# Patient Record
Sex: MALE | Race: BLACK OR AFRICAN AMERICAN | NOT HISPANIC OR LATINO | Employment: OTHER | URBAN - METROPOLITAN AREA
[De-identification: names, ages, dates, MRNs, and addresses within clinical notes are randomized per-mention and may not be internally consistent; named-entity substitution may affect disease eponyms.]

---

## 2017-01-10 DIAGNOSIS — C78.00 LUNG METASTASES: Primary | ICD-10-CM

## 2017-01-13 ENCOUNTER — HOSPITAL ENCOUNTER (OUTPATIENT)
Dept: RADIOLOGY | Facility: HOSPITAL | Age: 67
Discharge: HOME | End: 2017-01-13
Attending: INTERNAL MEDICINE
Payer: MEDICARE

## 2017-01-13 DIAGNOSIS — C78.00 LUNG METASTASES: ICD-10-CM

## 2017-01-13 LAB
CREAT SERPL-MCNC: 1 MG/DL
EST. GFR  (AFRICAN AMERICAN): >60 ML/MIN/1.73 M^2
EST. GFR  (NON AFRICAN AMERICAN): >60 ML/MIN/1.73 M^2

## 2017-01-13 PROCEDURE — 71260 CT THORAX DX C+: CPT | Mod: TC

## 2017-01-13 PROCEDURE — 25500020 PHARM REV CODE 255: Performed by: RADIOLOGY

## 2017-01-13 PROCEDURE — 82565 ASSAY OF CREATININE: CPT

## 2017-01-13 PROCEDURE — 74177 CT ABD & PELVIS W/CONTRAST: CPT | Mod: TC

## 2017-01-13 RX ADMIN — IOHEXOL 30 ML: 350 INJECTION, SOLUTION INTRAVENOUS at 12:01

## 2017-01-13 RX ADMIN — IOHEXOL 75 ML: 350 INJECTION, SOLUTION INTRAVENOUS at 03:01

## 2017-03-08 ENCOUNTER — OFFICE VISIT (OUTPATIENT)
Dept: FAMILY MEDICINE | Facility: CLINIC | Age: 67
End: 2017-03-08
Payer: MEDICARE

## 2017-03-08 VITALS
SYSTOLIC BLOOD PRESSURE: 118 MMHG | DIASTOLIC BLOOD PRESSURE: 70 MMHG | TEMPERATURE: 97 F | BODY MASS INDEX: 33.02 KG/M2 | RESPIRATION RATE: 20 BRPM | HEIGHT: 71 IN | WEIGHT: 235.88 LBS | HEART RATE: 82 BPM

## 2017-03-08 DIAGNOSIS — Z00.00 ENCOUNTER FOR PREVENTIVE HEALTH EXAMINATION: Primary | ICD-10-CM

## 2017-03-08 DIAGNOSIS — E66.9 OBESITY (BMI 30.0-34.9): ICD-10-CM

## 2017-03-08 DIAGNOSIS — G57.93 NEUROPATHY OF BOTH FEET: ICD-10-CM

## 2017-03-08 DIAGNOSIS — K21.9 GASTROESOPHAGEAL REFLUX DISEASE WITHOUT ESOPHAGITIS: ICD-10-CM

## 2017-03-08 DIAGNOSIS — R79.89 ABNORMAL LFTS: ICD-10-CM

## 2017-03-08 DIAGNOSIS — I10 ESSENTIAL HYPERTENSION: ICD-10-CM

## 2017-03-08 DIAGNOSIS — C20 RECTAL CANCER: ICD-10-CM

## 2017-03-08 DIAGNOSIS — H53.8 BLURRED VISION, BILATERAL: ICD-10-CM

## 2017-03-08 DIAGNOSIS — R91.8 MULTIPLE LUNG NODULES ON CT: ICD-10-CM

## 2017-03-08 DIAGNOSIS — E11.9 DIABETES MELLITUS WITHOUT COMPLICATION: ICD-10-CM

## 2017-03-08 DIAGNOSIS — Z93.1 GASTROSTOMY STATUS: ICD-10-CM

## 2017-03-08 PROCEDURE — 99999 PR PBB SHADOW E&M-EST. PATIENT-LVL III: CPT | Mod: PBBFAC,,, | Performed by: NURSE PRACTITIONER

## 2017-03-08 PROCEDURE — G0439 PPPS, SUBSEQ VISIT: HCPCS | Mod: S$GLB,,, | Performed by: NURSE PRACTITIONER

## 2017-03-08 PROCEDURE — 99499 UNLISTED E&M SERVICE: CPT | Mod: S$GLB,,, | Performed by: NURSE PRACTITIONER

## 2017-03-08 PROCEDURE — 3078F DIAST BP <80 MM HG: CPT | Mod: S$GLB,,, | Performed by: NURSE PRACTITIONER

## 2017-03-08 PROCEDURE — 3074F SYST BP LT 130 MM HG: CPT | Mod: S$GLB,,, | Performed by: NURSE PRACTITIONER

## 2017-03-08 NOTE — Clinical Note
Your patient was seen today for a HRA visit. Abnormalities have been identified during this visit that may require additional testing and follow up. I have included a copy of my visit note, please review the note and feel free to contact me with any questions.  Thank you for allowing me to participate in the care of your patients.  María Norman NP

## 2017-03-08 NOTE — PROGRESS NOTES
"Solo Cid presented for a  Medicare AWV and comprehensive Health Risk Assessment today. The following components were reviewed and updated:    · Medical history  · Family History  · Social history  · Allergies and Current Medications  · Health Risk Assessment  · Health Maintenance  · Care Team     ** See Completed Assessments for Annual Wellness Visit within the encounter summary.**       The following assessments were completed:  · Living Situation  · CAGE  · Depression Screening  · Timed Get Up and Go  · Whisper Test  · Cognitive Function Screening  · Nutrition Screening  · ADL Screening  · PAQ Screening    Vitals:    03/08/17 1251   BP: 118/70   Pulse: 82   Resp: 20   Temp: 97.4 °F (36.3 °C)   Weight: 107 kg (235 lb 14.3 oz)   Height: 5' 11" (1.803 m)     Body mass index is 32.9 kg/(m^2).  Physical Exam   Constitutional: He appears well-developed. No distress.   HENT:   Head: Normocephalic and atraumatic.   Eyes: Pupils are equal, round, and reactive to light.   Neck: Carotid bruit is not present.   Cardiovascular: Normal rate, regular rhythm, normal heart sounds, intact distal pulses and normal pulses.  Exam reveals no gallop.    No murmur heard.  Pulmonary/Chest: Effort normal and breath sounds normal.   Abdominal: Soft. Bowel sounds are normal. He exhibits no distension. There is no tenderness.   LLQ colostomy     Musculoskeletal: Normal range of motion. He exhibits no edema.   Neurological: He exhibits normal muscle tone.   Skin: Skin is warm, dry and intact.   Psychiatric: He has a normal mood and affect. His speech is normal and behavior is normal. Judgment and thought content normal. Cognition and memory are normal.   Nursing note and vitals reviewed.        Diagnoses and health risks identified today and associated recommendations/orders:    1. Encounter for preventive health examination    2. Rectal cancer  Stable and controlled. S/P radiation/ colostomy and currently on chemotherapy. States he " tolerating well but has fatigue days. Continue current treatment plan as previously prescribed with your colon rectal cancer specialist- Dr. Siddiqui.     3. Gastrostomy status  Stable and controlled S/P LLQ colostomy 2/2 rectal cancer 2015. . Continue current treatment plan as previously prescribed with your colon rectal cancer specialist- Dr. Siddiqui.       4. diabetes mellitus without complication  Component      Latest Ref Rng & Units 8/31/2016   Hemoglobin A1C      4.5 - 6.2 % 4.8   Estimated Avg Glucose      68 - 131 mg/d 91   Stable and controlled on Glucophage daily with diet modification. Continue current treatment plan as previously prescribed with your PCP-Dr. Malagon    5. Essential hypertension  Stable and controlled HCTZ  And Tizanidine. Continue current treatment plan as previously prescribed with your PCP.       6. Multiple lung nodules on CT   CT OF chest 2017 Multiple bilateral pulmonary nodules, the largest 18 mm.  Impression: No evidence of metastatic disease. Stable and controlled. Followed by PCP  and colon rectal cancer specialist- Dr. Siddiqui.    7. Abnormal LFT's  Component      Latest Ref Rng & Units 11/7/2016 8/31/2016   AST      10 - 40 U/L 49 (H) 58 (H)   ALT      10 - 44 U/L 82 (H) 73 (H)   No abnormality of the liver per CT of chest 1/ 2017.     Chronic and stable. Followed by Followed by PCP  and colon rectal cancer specialist- Dr. Siddiqui.    8. Blurred vision, bilateral  This is a new problem that has been identified during today's visit  Pt states worsen  Visual changes in last year . Pt referred to optholomogist 3/16/ 2017 for evaluation.    9. Neuropathy of both feet  Chronic and ongoing problem. Pt states n/t to bilateral toes due to chemo and radiations-states it stable and colon/rectal specialist is aware    10. Gastroesophageal reflux disease without esophagitis  Stable and controlled on Priolesc daily  Continue current treatment plan as previously prescribed with your PCP.        11.Obesity (BMI 30.0-34.9)  Chronic and ongoing problem. BMI 32.9. Today . Per chart  5 lb wt loss noted since 11/16. Followed by  PCP     Discuss health maintenance- pt request to discuss pneumonia vaccines, flu  Shot , Aorta screening and PSA level with PSA 3/14/ 17    Provided Solo with a 5-10 year written screening schedule and personal prevention plan. Recommendations were developed using the USPSTF age appropriate recommendations. Education, counseling, and referrals were provided as needed. After Visit Summary printed and given to patient which includes a list of additional screenings\tests needed.    Return in about 6 days (around 3/14/2017).    María Norman NP

## 2017-03-08 NOTE — PATIENT INSTRUCTIONS
Counseling and Referral of Other Preventative  (Italic type indicates deductible and co-insurance are waived)    Patient Name: Solo Cdi  Today's Date: 3/8/2017      SERVICE LIMITATIONS RECOMMENDATION    Vaccines    · Pneumococcal (once after 65)    · Influenza (annually)    · Hepatitis B (if medium/high risk)    · Prevnar 13      Hepatitis B medium/high risk factors:       - End-stage renal disease       - Hemophiliacs who received Factor VII or         IX concentrates       - Clients of institutions for the mentally             retarded       - Persons who live in the same house as          a HepB carrier       - Homosexual men       - Illicit injectable drug abusers     Pneumococcal: Due       Influenza: did not get last season       Hepatitis B: not needed      Prevnar 13:  DUe      Prostate cancer screening (annually to age 75)     Prostate specific antigen (PSA) Shared decision making with Provider. Sometimes a co-pay may be required if the patient decides to have this test. The USPSTF no longer recommends prostate cancer screening routinely in medicine: Due now    Colorectal cancer screening (to age 75)    · Fecal occult blood test (annual)  · Flexible sigmoidoscopy (5y)  · Screening colonoscopy (10y)  · Barium enema   S/P colostomy     Diabetes self-management training (no USPSTF recommendations)  Requires referral by treating physician for patient with diabetes or renal disease. 10 hours of initial DSMT sessions of no less than 30 minutes each in a continuous 12-month period. 2 hours of follow-up DSMT in subsequent years.  Discuss with PCP    Glaucoma screening (no USPSTF recommendation)  Diabetes mellitus, family history   , age 50 or over    American, age 65 or over Due     Medical nutrition therapy for diabetes or renal disease (no recommended schedule)  Requires referral by treating physician for patient with diabetes or renal disease or kidney transplant within the past 3  years.  Can be provided in same year as diabetes self-management training (DSMT), and CMS recommends medical nutrition therapy take place after DSMT. Up to 3 hours for initial year and 2 hours in subsequent years.  Due    Cardiovascular screening blood tests (every 5 years)  · Fasting lipid panel  Order as a panel if possible  Done this year, repeat every year    Diabetes screening tests (at least every 3 years, Medicare covers annually or at 6-month intervals for prediabetic patients)  · Fasting blood sugar (FBS) or glucose tolerance test (GTT)  Patient must be diagnosed with one of the following:       - Hypertension       - Dyslipidemia       - Obesity (BMI 30kg/m2)       - Previous elevated impaired FBS or GTT       ... or any two of the following:       - Overweight (BMI 25 but <30)       - Family history of diabetes       - Age 65 or older       - History of gestational diabetes or birth of baby weighing more than 9 pounds  Done this year, repeat every year    Abdominal aortic aneurysm screening (once)  · Sonogram   Limited to patients who meet one of the following criteria:       - Men who are 65-75 years old and have smoked more than 100 cigarette in their lifetime       - Anyone with a family history of abdominal aortic aneurysm       - Anyone recommended for screening by the USPSTF Need by 6/2017    HIV screening (annually for increased risk patients)  · HIV-1 and HIV-2 by EIA, or JORI, rapid antibody test or oral mucosa transudate  Patients must be at increased risk for HIV infection per USPSTF guidelines or pregnant. Tests covered annually for patient at increased risk or as requested by the patient. Pregnant patients may receive up to 3 tests during pregnancy.  Risks discussed, screening is Done per pt     Smoking cessation counseling (up to 8 sessions per year)  Patients must be asymptomatic of tobacco-related conditions to receive as a preventative service.  not needed    Subsequent annual wellness  visit  At least 12 months since last AWV  Return in one year     The following information is provided to all patients.  This information is to help you find resources for any of the problems found today that may be affecting your health:                Living healthy guide: www.[facility].[place]      Understanding Diabetes: www.diabetes.org      Eating healthy: www.cdc.gov/healthyweight      [facility] home safety checklist: www.cdc.gov/steadi/patient.html      Agency on Aging: www.goea.[place].[facility]      Alcoholics anonymous (AA): www.aa.org      Physical Activity: www.yayo.nih.gov/ng6gpbn      Tobacco use: www.quitwithusla.org

## 2017-03-08 NOTE — MR AVS SNAPSHOT
[facility]  [address] [address]  Phone: [phone]                  Solo iCd   3/8/2017 11:00 AM   Office Visit    Description:  Male : 1950   Provider:  María Norman NP   Department:  [facility]           Diagnoses this Visit        Comments    Encounter for preventive health examination    -  Primary            To Do List           Future Appointments        Provider Department Dept Phone    3/14/2017 9:00 AM MD Freddie Berry - Internal Medicine [phone]    3/16/2017 9:00 AM KOSTAS Sweet - Ophthalmology [phone]      Goals (5 Years of Data)     None      [facility]     [facility] On Call Nurse Care Line -  Assistance  Registered nurses in the [facility] On Call Center provide clinical advisement, health education, appointment booking, and other advisory services.  Call for this free service at [phone].             Medications                Verify that the below list of medications is an accurate representation of the medications you are currently taking.  If none reported, the list may be blank. If incorrect, please contact your healthcare provider. Carry this list with you in case of emergency.           Current Medications     hydrochlorothiazide (HYDRODIURIL) 12.5 MG Tab Take 1 tablet (12.5 mg total) by mouth once daily.    hydrocodone-acetaminophen 10-325mg (NORCO)  mg Tab     metformin (GLUCOPHAGE) 500 MG tablet Take 1 tablet (500 mg total) by mouth 2 (two) times daily with meals.    omeprazole (PRILOSEC) 40 MG capsule     polyethylene glycol (GLYCOLAX) 17 gram/dose powder Take 17 g by mouth once daily.    tizanidine 4 mg Cap Take 4 mg by mouth.           Clinical Reference Information           Allergies as of 3/8/2017     No Known Allergies      Immunizations Administered on Date of Encounter - 3/8/2017     None      [facility] Sign-Up     Activating your  [facility] account is as easy as 1-2-3!     1) Visit [email or web address], select Sign Up Now, enter this activation code and your date of birth, then select Next.  Activation code not generated  Current Patient Portal Status: Account disabled      2) Create a username and password to use when you visit [facility] in the future and select a security question in case you lose your password and select Next.    3) Enter your e-mail address and click Sign Up!    Additional Information  If you have questions, please e-mail [email or web address] or call [phone] to talk to our [facility] staff. Remember, [facility] is NOT to be used for urgent needs. For medical emergencies, dial 911.         Instructions      Counseling and Referral of Other Preventative  (Italic type indicates deductible and co-insurance are waived)    Patient Name: Solo Cid  Today's Date: 3/8/2017      SERVICE LIMITATIONS RECOMMENDATION    Vaccines    · Pneumococcal (once after 65)    · Influenza (annually)    · Hepatitis B (if medium/high risk)    · Prevnar 13      Hepatitis B medium/high risk factors:       - End-stage renal disease       - Hemophiliacs who received Factor VII or         IX concentrates       - Clients of institutions for the mentally             retarded       - Persons who live in the same house as          a HepB carrier       - Homosexual men       - Illicit injectable drug abusers     Pneumococcal: Due       Influenza: did not get last season       Hepatitis B: not needed      Prevnar 13:  DUe      Prostate cancer screening (annually to age 75)     Prostate specific antigen (PSA) Shared decision making with Provider. Sometimes a co-pay may be required if the patient decides to have this test. The USPSTF no longer recommends prostate cancer screening routinely in medicine: Due now    Colorectal cancer screening (to age 75)    · Fecal occult blood test (annual)  · Flexible sigmoidoscopy (5y)  · Screening colonoscopy  (10y)  · Barium enema   S/P colostomy     Diabetes self-management training (no USPSTF recommendations)  Requires referral by treating physician for patient with diabetes or renal disease. 10 hours of initial DSMT sessions of no less than 30 minutes each in a continuous 12-month period. 2 hours of follow-up DSMT in subsequent years.  Discuss with PCP    Glaucoma screening (no USPSTF recommendation)  Diabetes mellitus, family history   , age 50 or over    American, age 65 or over Due     Medical nutrition therapy for diabetes or renal disease (no recommended schedule)  Requires referral by treating physician for patient with diabetes or renal disease or kidney transplant within the past 3 years.  Can be provided in same year as diabetes self-management training (DSMT), and CMS recommends medical nutrition therapy take place after DSMT. Up to 3 hours for initial year and 2 hours in subsequent years.  Due    Cardiovascular screening blood tests (every 5 years)  · Fasting lipid panel  Order as a panel if possible  Done this year, repeat every year    Diabetes screening tests (at least every 3 years, Medicare covers annually or at 6-month intervals for prediabetic patients)  · Fasting blood sugar (FBS) or glucose tolerance test (GTT)  Patient must be diagnosed with one of the following:       - Hypertension       - Dyslipidemia       - Obesity (BMI 30kg/m2)       - Previous elevated impaired FBS or GTT       ... or any two of the following:       - Overweight (BMI 25 but <30)       - Family history of diabetes       - Age 65 or older       - History of gestational diabetes or birth of baby weighing more than 9 pounds  Done this year, repeat every year    Abdominal aortic aneurysm screening (once)  · Sonogram   Limited to patients who meet one of the following criteria:       - Men who are 65-75 years old and have smoked more than 100 cigarette in their lifetime       - Anyone with a family history  of abdominal aortic aneurysm       - Anyone recommended for screening by the USPSTF Need by 6/2017    HIV screening (annually for increased risk patients)  · HIV-1 and HIV-2 by EIA, or JORI, rapid antibody test or oral mucosa transudate  Patients must be at increased risk for HIV infection per USPSTF guidelines or pregnant. Tests covered annually for patient at increased risk or as requested by the patient. Pregnant patients may receive up to 3 tests during pregnancy.  Risks discussed, screening is Done per pt     Smoking cessation counseling (up to 8 sessions per year)  Patients must be asymptomatic of tobacco-related conditions to receive as a preventative service.  not needed    Subsequent annual wellness visit  At least 12 months since last AWV  Return in one year     The following information is provided to all patients.  This information is to help you find resources for any of the problems found today that may be affecting your health:                Living healthy guide: www.[facility].[place].[facility]      Understanding Diabetes: www.diabetes.org      Eating healthy: www.cdc.gov/healthyweight      [facility] home safety checklist: www.cdc.gov/steadi/patient.html      Agency on Aging: www.goea.[place].[facility]      Alcoholics anonymous (AA): www.aa.org      Physical Activity: www.yayo.nih.gov/gx6gvkn      Tobacco use: www.quitwithusla.org          Language Assistance Services     ATTENTION: Language assistance services are available, free of charge. Please call [phone].      ATENCIÓN: Si habla español, tiene a vick disposición servicios gratuitos de asistencia lingüística. Llame al [phone].     CHÚ Ý: N?u b?n nói Ti?ng Vi?t, có các d?ch v? h? tr? ngôn ng? mi?n phí dành cho b?n. G?i s? [phone].         [facility] complies with applicable Federal civil rights laws and does not discriminate on the basis of race, color, national origin, age, disability, or sex.

## 2017-03-17 ENCOUNTER — LAB VISIT (OUTPATIENT)
Dept: LAB | Facility: HOSPITAL | Age: 67
End: 2017-03-17
Attending: INTERNAL MEDICINE
Payer: MEDICARE

## 2017-03-17 ENCOUNTER — OFFICE VISIT (OUTPATIENT)
Dept: INTERNAL MEDICINE | Facility: CLINIC | Age: 67
End: 2017-03-17
Payer: MEDICARE

## 2017-03-17 VITALS
HEIGHT: 64 IN | OXYGEN SATURATION: 97 % | BODY MASS INDEX: 40.19 KG/M2 | DIASTOLIC BLOOD PRESSURE: 74 MMHG | WEIGHT: 235.44 LBS | HEART RATE: 99 BPM | RESPIRATION RATE: 16 BRPM | TEMPERATURE: 99 F | SYSTOLIC BLOOD PRESSURE: 126 MMHG

## 2017-03-17 DIAGNOSIS — E11.9 DIABETES MELLITUS WITHOUT COMPLICATION: ICD-10-CM

## 2017-03-17 DIAGNOSIS — R53.83 FATIGUE, UNSPECIFIED TYPE: ICD-10-CM

## 2017-03-17 DIAGNOSIS — Z93.1 GASTROSTOMY STATUS: ICD-10-CM

## 2017-03-17 DIAGNOSIS — R33.9 URINARY RETENTION: ICD-10-CM

## 2017-03-17 DIAGNOSIS — E55.9 VITAMIN D DEFICIENCY: ICD-10-CM

## 2017-03-17 DIAGNOSIS — Z12.5 ENCOUNTER FOR SCREENING FOR MALIGNANT NEOPLASM OF PROSTATE: ICD-10-CM

## 2017-03-17 DIAGNOSIS — R07.89 OTHER CHEST PAIN: ICD-10-CM

## 2017-03-17 DIAGNOSIS — E66.01 MORBID OBESITY DUE TO EXCESS CALORIES: ICD-10-CM

## 2017-03-17 DIAGNOSIS — C20 RECTAL CANCER: ICD-10-CM

## 2017-03-17 DIAGNOSIS — I10 ESSENTIAL HYPERTENSION: ICD-10-CM

## 2017-03-17 DIAGNOSIS — H53.9 VISUAL DISTURBANCE: Primary | ICD-10-CM

## 2017-03-17 LAB
25(OH)D3+25(OH)D2 SERPL-MCNC: 19 NG/ML
ALBUMIN SERPL BCP-MCNC: 3.3 G/DL
ALP SERPL-CCNC: 73 U/L
ALT SERPL W/O P-5'-P-CCNC: 97 U/L
ANION GAP SERPL CALC-SCNC: 11 MMOL/L
AST SERPL-CCNC: 111 U/L
BASOPHILS # BLD AUTO: 0.03 K/UL
BASOPHILS NFR BLD: 1 %
BILIRUB SERPL-MCNC: 0.8 MG/DL
BUN SERPL-MCNC: 18 MG/DL
CALCIUM SERPL-MCNC: 9.3 MG/DL
CHLORIDE SERPL-SCNC: 104 MMOL/L
CHOLEST/HDLC SERPL: 3.5 {RATIO}
CO2 SERPL-SCNC: 24 MMOL/L
COMPLEXED PSA SERPL-MCNC: 0.67 NG/ML
CREAT SERPL-MCNC: 1 MG/DL
CREAT UR-MCNC: 303 MG/DL
DIFFERENTIAL METHOD: ABNORMAL
EOSINOPHIL # BLD AUTO: 0.2 K/UL
EOSINOPHIL NFR BLD: 8.2 %
ERYTHROCYTE [DISTWIDTH] IN BLOOD BY AUTOMATED COUNT: 14.4 %
EST. GFR  (AFRICAN AMERICAN): >60 ML/MIN/1.73 M^2
EST. GFR  (NON AFRICAN AMERICAN): >60 ML/MIN/1.73 M^2
GLUCOSE SERPL-MCNC: 177 MG/DL
HCT VFR BLD AUTO: 42.4 %
HDL/CHOLESTEROL RATIO: 28.9 %
HDLC SERPL-MCNC: 159 MG/DL
HDLC SERPL-MCNC: 46 MG/DL
HGB BLD-MCNC: 14.1 G/DL
LDLC SERPL CALC-MCNC: 85.4 MG/DL
LYMPHOCYTES # BLD AUTO: 0.9 K/UL
LYMPHOCYTES NFR BLD: 30.5 %
MCH RBC QN AUTO: 31.7 PG
MCHC RBC AUTO-ENTMCNC: 33.3 %
MCV RBC AUTO: 95 FL
MICROALBUMIN UR DL<=1MG/L-MCNC: 29 UG/ML
MICROALBUMIN/CREATININE RATIO: 9.6 UG/MG
MONOCYTES # BLD AUTO: 0.8 K/UL
MONOCYTES NFR BLD: 26 %
NEUTROPHILS # BLD AUTO: 1 K/UL
NEUTROPHILS NFR BLD: 34.3 %
NONHDLC SERPL-MCNC: 113 MG/DL
PLATELET # BLD AUTO: 210 K/UL
PMV BLD AUTO: 10.8 FL
POTASSIUM SERPL-SCNC: 4.1 MMOL/L
PROT SERPL-MCNC: 7.1 G/DL
RBC # BLD AUTO: 4.45 M/UL
SODIUM SERPL-SCNC: 139 MMOL/L
TRIGL SERPL-MCNC: 138 MG/DL
TSH SERPL DL<=0.005 MIU/L-ACNC: 2.38 UIU/ML
WBC # BLD AUTO: 2.92 K/UL

## 2017-03-17 PROCEDURE — 99213 OFFICE O/P EST LOW 20 MIN: CPT | Mod: S$GLB,,, | Performed by: INTERNAL MEDICINE

## 2017-03-17 PROCEDURE — 84443 ASSAY THYROID STIM HORMONE: CPT

## 2017-03-17 PROCEDURE — 80061 LIPID PANEL: CPT

## 2017-03-17 PROCEDURE — 84153 ASSAY OF PSA TOTAL: CPT

## 2017-03-17 PROCEDURE — 99499 UNLISTED E&M SERVICE: CPT | Mod: S$GLB,,, | Performed by: INTERNAL MEDICINE

## 2017-03-17 PROCEDURE — 85025 COMPLETE CBC W/AUTO DIFF WBC: CPT

## 2017-03-17 PROCEDURE — 82306 VITAMIN D 25 HYDROXY: CPT

## 2017-03-17 PROCEDURE — 36415 COLL VENOUS BLD VENIPUNCTURE: CPT | Mod: PO

## 2017-03-17 PROCEDURE — 80053 COMPREHEN METABOLIC PANEL: CPT

## 2017-03-17 PROCEDURE — 83036 HEMOGLOBIN GLYCOSYLATED A1C: CPT

## 2017-03-17 PROCEDURE — 99999 PR PBB SHADOW E&M-EST. PATIENT-LVL III: CPT | Mod: PBBFAC,,, | Performed by: INTERNAL MEDICINE

## 2017-03-17 RX ORDER — METFORMIN HYDROCHLORIDE 500 MG/1
500 TABLET ORAL
Qty: 30 TABLET | Refills: 11
Start: 2017-03-17 | End: 2018-07-30

## 2017-03-17 NOTE — MR AVS SNAPSHOT
[facility]  [address]  [place] [address]  Phone: [phone]                  Solo Cid   3/17/2017 9:00 AM   Office Visit    Description:  Male : 1950   Provider:  Jude Malagon MD   Department:  [facility] Medicine           Reason for Visit     Hospital Follow Up     Diabetes     Hypertension           Diagnoses this Visit        Comments    Visual disturbance    -  Primary     Rectal cancer         Diabetes mellitus without complication         Gastrostomy status         Other chest pain         Fatigue, unspecified type         Urinary retention         Vitamin D deficiency         Morbid obesity due to excess calories         Encounter for screening for malignant neoplasm of prostate         Essential hypertension                To Do List           Future Appointments        Provider Department Dept Phone    3/17/2017 10:10 AM LABORATORY, [facility]     3/21/2017 10:30 AM RJ Lau OD [facility] - Ophthalmology [phone]    2017 10:00 AM Jude Malagon MD [facility] Internal Medicine [phone]      Goals (5 Years of Data)     None      Follow-Up and Disposition     Return in about 2 months (around 2017), or if symptoms worsen or fail to improve, for Annual.    Follow-up and Disposition History       These Medications        Disp Refills Start End    metformin (GLUCOPHAGE) 500 MG tablet 30 tablet 11 3/17/2017     Take 1 tablet (500 mg total) by mouth daily with breakfast. - Oral    Pharmacy: [facility] [id number] [place] [address] [address] [place] [address] AT [facility] Ph #: [phone]         [facility]     [facility] On Call Nurse Care Line -  Assistance  Registered nurses in the [facility] On Call Center provide clinical advisement, health education, appointment booking, and other advisory services.  Call for this free service at [phone].             Medications  "          CHANGE how you are taking these medications     Start Taking Instead of    metformin (GLUCOPHAGE) 500 MG tablet metformin (GLUCOPHAGE) 500 MG tablet    Dosage:  Take 1 tablet (500 mg total) by mouth daily with breakfast. Dosage:  Take 1 tablet (500 mg total) by mouth 2 (two) times daily with meals.    Reason for Change:  Reorder            Verify that the below list of medications is an accurate representation of the medications you are currently taking.  If none reported, the list may be blank. If incorrect, please contact your healthcare provider. Carry this list with you in case of emergency.           Current Medications     hydrochlorothiazide (HYDRODIURIL) 12.5 MG Tab Take 1 tablet (12.5 mg total) by mouth once daily.    hydrocodone-acetaminophen 10-325mg (NORCO)  mg Tab     metformin (GLUCOPHAGE) 500 MG tablet Take 1 tablet (500 mg total) by mouth daily with breakfast.    omeprazole (PRILOSEC) 40 MG capsule     polyethylene glycol (GLYCOLAX) 17 gram/dose powder Take 17 g by mouth once daily.    tizanidine 4 mg Cap Take 4 mg by mouth.           Clinical Reference Information           Your Vitals Were     BP Pulse Temp Resp    126/74 (BP Location: Left arm, Patient Position: Sitting, BP Method: Manual) 99 98.7 °F (37.1 °C) (Tympanic) 16    Height Weight SpO2 BMI    5' 4" (1.626 m) 106.8 kg (235 lb 7.2 oz) 97% 40.42 kg/m2      Blood Pressure          Most Recent Value    BP  126/74      Allergies as of 3/17/2017     No Known Allergies      Immunizations Administered on Date of Encounter - 3/17/2017     None      Orders Placed During Today's Visit      Normal Orders This Visit    Ambulatory Referral to Ophthalmology     Microalbumin/creatinine urine ratio     Future Labs/Procedures Expected by Expires    CBC auto differential  3/17/2017 5/16/2018    Comprehensive metabolic panel  3/17/2017 5/16/2018    Hemoglobin A1c  3/17/2017 5/16/2018    Lipid panel  3/17/2017 5/16/2018    PSA, Screening  " 3/17/2017 (Approximate) 5/16/2018    TSH  3/17/2017 5/16/2018    Vitamin D  3/17/2017 5/16/2018      [facility] Sign-Up     Activating your [facility] account is as easy as 1-2-3!     1) Visit [email or web address], select Sign Up Now, enter this activation code and your date of birth, then select Next.  Activation code not generated  Current Patient Portal Status: Account disabled      2) Create a username and password to use when you visit [facility] in the future and select a security question in case you lose your password and select Next.    3) Enter your e-mail address and click Sign Up!    Additional Information  If you have questions, please e-mail [email or web address] or call [phone] to talk to our [employer] staff. Remember, [facility] is NOT to be used for urgent needs. For medical emergencies, dial 911.         Language Assistance Services     ATTENTION: Language assistance services are available, free of charge. Please call [phone].      ATENCIÓN: Si habla sandra, tiene a vick disposición servicios gratuitos de asistencia lingüística. Llame al [phone].     JACLYN Ý: N?u b?n nói Ti?ng Vi?t, có các d?ch v? h? tr? ngôn ng? mi?n phí dành cho b?n. G?i s? [phone].         [facility] complies with applicable Federal civil rights laws and does not discriminate on the basis of race, color, national origin, age, disability, or sex.

## 2017-03-18 LAB
ESTIMATED AVG GLUCOSE: 134 MG/DL
HBA1C MFR BLD HPLC: 6.3 %

## 2017-03-21 ENCOUNTER — OFFICE VISIT (OUTPATIENT)
Dept: OPHTHALMOLOGY | Facility: CLINIC | Age: 67
End: 2017-03-21
Payer: MEDICARE

## 2017-03-21 DIAGNOSIS — H52.7 REFRACTIVE ERROR: ICD-10-CM

## 2017-03-21 DIAGNOSIS — E11.9 DIABETES MELLITUS TYPE 2 WITHOUT RETINOPATHY: Primary | ICD-10-CM

## 2017-03-21 DIAGNOSIS — Z13.5 GLAUCOMA SCREENING: ICD-10-CM

## 2017-03-21 PROCEDURE — 99499 UNLISTED E&M SERVICE: CPT | Mod: S$GLB,,, | Performed by: OPTOMETRIST

## 2017-03-21 PROCEDURE — 92015 DETERMINE REFRACTIVE STATE: CPT | Mod: S$GLB,,, | Performed by: OPTOMETRIST

## 2017-03-21 PROCEDURE — 92004 COMPRE OPH EXAM NEW PT 1/>: CPT | Mod: S$GLB,,, | Performed by: OPTOMETRIST

## 2017-03-21 PROCEDURE — 99999 PR PBB SHADOW E&M-EST. PATIENT-LVL II: CPT | Mod: PBBFAC,,, | Performed by: OPTOMETRIST

## 2017-03-21 NOTE — LETTER
March 21, 2017      Jude Malagon MD  [address]  [place] [address]           [facility] - Ophthalmology  [address] [facility] [address] [place] [address]  Phone: [phone]  Fax: [phone]          Patient: Solo Cid   MR Number: [id number]   YOB: 1950   Date of Visit: 3/21/2017       Dear Dr. Jude Malagon:    Thank you for referring Solo Cid to me for evaluation. Attached you will find relevant portions of my assessment and plan of care.    If you have questions, please do not hesitate to call me. I look forward to following Solo Cid along with you.    Sincerely,    RJ Lau, OD    Enclosure  CC:  No Recipients    If you would like to receive this communication electronically, please contact [email or web address] or [phone] to request more information on [employer] Link access.    For providers and/or their staff who would like to refer a patient to [facility], please contact us through our one-stop-shop provider referral line, [facility] , at [phone].    If you feel you have received this communication in error or would no longer like to receive these types of communications, please e-mail [email or web address]

## 2017-03-21 NOTE — PROGRESS NOTES
HPI     New Patient  Diabetic/NIDDM  Screening for glaucoma  RE  Decreased near point  Not using any vision correction at this time       Last edited by Ramirez Ozuna MA on 3/21/2017 10:06 AM.         Assessment /Plan     For exam results, see Encounter Report.    Diabetes mellitus type 2 without retinopathy    Cataract, nuclear, bilateral    Glaucoma screening    Refractive error      No diabetic retinopathy OU.  Mild NS cataracts OU = discussed and will follow.  OH OK OU otherwise.  OTC readers.  RTC one year.

## 2017-03-21 NOTE — MR AVS SNAPSHOT
[facility] - [facility]hthal[facility]  [address] [place] [address]  Phone: [phone]  Fax: [phone]                  Solo Cid   3/21/2017 10:30 AM   Office Visit    Description:  Male : 1950   Provider:  RJ Lau OD   Department:  [facility] - Ophthalmology           Reason for Visit     Diabetic Eye Exam           Diagnoses this Visit        Comments    Diabetes mellitus type 2 without retinopathy    -  Primary     Cataract, nuclear, bilateral         Glaucoma screening         Refractive error                To Do List           Future Appointments        Provider Department Dept Phone    2017 10:00 AM MD [place] [facility] - Internal Medicine [phone]      Goals (5 Years of Data)     None      Follow-Up and Disposition     Return in about 1 year (around 3/21/2018).      [facility] On [facility]     [facility] On Call Nurse Care Line -  Assistance  Registered nurses in the [facility] On Call Center provide clinical advisement, health education, appointment booking, and other advisory services.  Call for this free service at [phone].             Medications                Verify that the below list of medications is an accurate representation of the medications you are currently taking.  If none reported, the list may be blank. If incorrect, please contact your healthcare provider. Carry this list with you in case of emergency.           Current Medications     hydrochlorothiazide (HYDRODIURIL) 12.5 MG Tab Take 1 tablet (12.5 mg total) by mouth once daily.    hydrocodone-acetaminophen 10-325mg (NORCO)  mg Tab     metformin (GLUCOPHAGE) 500 MG tablet Take 1 tablet (500 mg total) by mouth daily with breakfast.    omeprazole (PRILOSEC) 40 MG capsule     polyethylene glycol (GLYCOLAX) 17 gram/dose powder Take 17 g by mouth once daily.    tizanidine 4 mg Cap Take 4 mg by mouth.           Clinical Reference Information           Allergies as of 3/21/2017     No Known Allergies       Immunizations Administered on Date of Encounter - 3/21/2017     None      [facility] Sign-Up     Activating your [facility] account is as easy as 1-2-3!     1) Visit [email or web address], select Sign Up Now, enter this activation code and your date of birth, then select Next.  Activation code not generated  Current Patient Portal Status: Account disabled      2) Create a username and password to use when you visit [facility] in the future and select a security question in case you lose your password and select Next.    3) Enter your e-mail address and click Sign Up!    Additional Information  If you have questions, please e-mail [email or web address] or call [phone] to talk to our [facility] staff. Remember, [facility] is NOT to be used for urgent needs. For medical emergencies, dial 911.         Language Assistance Services     ATTENTION: Language assistance services are available, free of charge. Please call [phone].      ATENCIÓN: Si habla sandra, tiene a vick disposición servicios gratuitos de asistencia lingüística. Llame al [phone].     CHÚ Ý: N?u b?n nói Ti?ng Vi?t, có các d?ch v? h? tr? ngôn ng? mi?n phí dành cho b?n. G?i s? [phone].         [facility] complies with applicable Federal civil rights laws and does not discriminate on the basis of race, color, national origin, age, disability, or sex.

## 2017-05-17 ENCOUNTER — OFFICE VISIT (OUTPATIENT)
Dept: INTERNAL MEDICINE | Facility: CLINIC | Age: 67
End: 2017-05-17
Payer: MEDICARE

## 2017-05-17 ENCOUNTER — LAB VISIT (OUTPATIENT)
Dept: LAB | Facility: HOSPITAL | Age: 67
End: 2017-05-17
Attending: INTERNAL MEDICINE
Payer: MEDICARE

## 2017-05-17 VITALS
DIASTOLIC BLOOD PRESSURE: 69 MMHG | WEIGHT: 228.38 LBS | TEMPERATURE: 98 F | BODY MASS INDEX: 31.97 KG/M2 | SYSTOLIC BLOOD PRESSURE: 132 MMHG | HEART RATE: 87 BPM | HEIGHT: 71 IN | OXYGEN SATURATION: 97 %

## 2017-05-17 DIAGNOSIS — T45.1X5A PANCYTOPENIA DUE TO ANTINEOPLASTIC CHEMOTHERAPY: ICD-10-CM

## 2017-05-17 DIAGNOSIS — Z71.89 COUNSELING ON HEALTH PROMOTION AND DISEASE PREVENTION: ICD-10-CM

## 2017-05-17 DIAGNOSIS — C20 RECTAL CANCER: ICD-10-CM

## 2017-05-17 DIAGNOSIS — I10 ESSENTIAL HYPERTENSION: ICD-10-CM

## 2017-05-17 DIAGNOSIS — R79.89 ABNORMAL LFTS: ICD-10-CM

## 2017-05-17 DIAGNOSIS — D61.810 PANCYTOPENIA DUE TO ANTINEOPLASTIC CHEMOTHERAPY: ICD-10-CM

## 2017-05-17 DIAGNOSIS — E55.9 VITAMIN D DEFICIENCY: ICD-10-CM

## 2017-05-17 DIAGNOSIS — Z93.1 GASTROSTOMY STATUS: ICD-10-CM

## 2017-05-17 DIAGNOSIS — E11.9 DIABETES MELLITUS WITHOUT COMPLICATION: ICD-10-CM

## 2017-05-17 DIAGNOSIS — E66.01 MORBID OBESITY DUE TO EXCESS CALORIES: ICD-10-CM

## 2017-05-17 DIAGNOSIS — R79.89 ABNORMAL LFTS: Primary | ICD-10-CM

## 2017-05-17 LAB
ALT SERPL W/O P-5'-P-CCNC: 45 U/L
BASOPHILS # BLD AUTO: 0.03 K/UL
BASOPHILS NFR BLD: 0.6 %
DIFFERENTIAL METHOD: ABNORMAL
EOSINOPHIL # BLD AUTO: 0.2 K/UL
EOSINOPHIL NFR BLD: 4.2 %
ERYTHROCYTE [DISTWIDTH] IN BLOOD BY AUTOMATED COUNT: 13.6 %
HCT VFR BLD AUTO: 44.8 %
HGB BLD-MCNC: 14.9 G/DL
LYMPHOCYTES # BLD AUTO: 1.6 K/UL
LYMPHOCYTES NFR BLD: 31.2 %
MCH RBC QN AUTO: 32.1 PG
MCHC RBC AUTO-ENTMCNC: 33.3 %
MCV RBC AUTO: 97 FL
MONOCYTES # BLD AUTO: 0.9 K/UL
MONOCYTES NFR BLD: 17.9 %
NEUTROPHILS # BLD AUTO: 2.3 K/UL
NEUTROPHILS NFR BLD: 46.1 %
PLATELET # BLD AUTO: 206 K/UL
PMV BLD AUTO: 10.5 FL
RBC # BLD AUTO: 4.64 M/UL
WBC # BLD AUTO: 5.04 K/UL

## 2017-05-17 PROCEDURE — 99999 PR PBB SHADOW E&M-EST. PATIENT-LVL III: CPT | Mod: PBBFAC,,, | Performed by: INTERNAL MEDICINE

## 2017-05-17 PROCEDURE — 99214 OFFICE O/P EST MOD 30 MIN: CPT | Mod: S$GLB,,, | Performed by: INTERNAL MEDICINE

## 2017-05-17 PROCEDURE — 99499 UNLISTED E&M SERVICE: CPT | Mod: S$GLB,,, | Performed by: INTERNAL MEDICINE

## 2017-05-17 PROCEDURE — 3044F HG A1C LEVEL LT 7.0%: CPT | Mod: S$GLB,,, | Performed by: INTERNAL MEDICINE

## 2017-05-17 PROCEDURE — 85025 COMPLETE CBC W/AUTO DIFF WBC: CPT

## 2017-05-17 PROCEDURE — 86787 VARICELLA-ZOSTER ANTIBODY: CPT

## 2017-05-17 PROCEDURE — 1160F RVW MEDS BY RX/DR IN RCRD: CPT | Mod: S$GLB,,, | Performed by: INTERNAL MEDICINE

## 2017-05-17 PROCEDURE — 90670 PCV13 VACCINE IM: CPT | Mod: S$GLB,,, | Performed by: INTERNAL MEDICINE

## 2017-05-17 PROCEDURE — G0009 ADMIN PNEUMOCOCCAL VACCINE: HCPCS | Mod: S$GLB,,, | Performed by: INTERNAL MEDICINE

## 2017-05-17 PROCEDURE — 3078F DIAST BP <80 MM HG: CPT | Mod: S$GLB,,, | Performed by: INTERNAL MEDICINE

## 2017-05-17 PROCEDURE — 3060F POS MICROALBUMINURIA REV: CPT | Mod: 8P,S$GLB,, | Performed by: INTERNAL MEDICINE

## 2017-05-17 PROCEDURE — 3075F SYST BP GE 130 - 139MM HG: CPT | Mod: S$GLB,,, | Performed by: INTERNAL MEDICINE

## 2017-05-17 PROCEDURE — 36415 COLL VENOUS BLD VENIPUNCTURE: CPT | Mod: PO

## 2017-05-17 PROCEDURE — 84460 ALANINE AMINO (ALT) (SGPT): CPT

## 2017-05-17 RX ORDER — ERGOCALCIFEROL 1.25 MG/1
50000 CAPSULE ORAL
Qty: 8 CAPSULE | Refills: 0 | Status: SHIPPED | OUTPATIENT
Start: 2017-05-17 | End: 2017-10-24 | Stop reason: SDUPTHER

## 2017-05-17 NOTE — PROGRESS NOTES
Subjective:      Patient ID: Solo Cid is a 66 y.o. male.    Chief Complaint: Follow-up      HPI  Mr. Cid is a patient of mine, who presents for f/u on CRC. He reports being on his second month off of chemo because his CT abn was improved compared to last year's CT. He reports his numbness of his fingers have resolved and in his toes are improving.     He reports having n/v x6 times Monday, which he attributes to eating unhealthy on Mother's Day.    Past Medical History:   Diagnosis Date    Abnormal LFTs     Arthritis     Back pain     Chronic bronchitis     Diabetes mellitus without complication     Gastrostomy status     Hypertension     Obesity     Peripheral neuropathy 03/08/2017    Noted by NP during preventive visit; oncologist, Dr. Siddiqui, is aware    Rectal cancer     s/p XRT/CTX (Oncologist: Dr. Gonzalez Siddiqui: [phone]/Abdominal Perineal Resection (Proctologist: Dr. Shivam Reynaga)    Visual disturbance 03/08/2017    bilateral blurry vision reported to NP on prevention visit     Past Surgical History:   Procedure Laterality Date    CERVICAL FUSION      COLOSTOMY  2015    MEDIPORT INSERTION, SINGLE  2015     Social History     Social History    Marital status: Single     Spouse name: N/A    Number of children: N/A    Years of education: N/A     Occupational History    Not on file.     Social History Main Topics    Smoking status: Former Smoker     Quit date: 1/21/2015    Smokeless tobacco: Never Used    Alcohol use No    Drug use: No    Sexual activity: Not Currently     Other Topics Concern    Not on file     Social History Narrative     Family History   Problem Relation Age of Onset    Hypertension Mother     Diabetes Mother     Kidney disease Father      Dialysis    Hypertension Father     Stroke Father     Hypertension Sister     Heart attack Brother     Heart attack Brother     Cancer Neg Hx        Current Outpatient Prescriptions:     hydrochlorothiazide  "(HYDRODIURIL) 12.5 MG Tab, Take 1 tablet (12.5 mg total) by mouth once daily., Disp: 30 tablet, Rfl: 11    hydrocodone-acetaminophen 10-325mg (NORCO)  mg Tab, , Disp: , Rfl: 0    metformin (GLUCOPHAGE) 500 MG tablet, Take 1 tablet (500 mg total) by mouth daily with breakfast., Disp: 30 tablet, Rfl: 11    omeprazole (PRILOSEC) 40 MG capsule, , Disp: , Rfl: 3    polyethylene glycol (GLYCOLAX) 17 gram/dose powder, Take 17 g by mouth once daily., Disp: , Rfl:     tizanidine 4 mg Cap, Take 4 mg by mouth., Disp: , Rfl:     ergocalciferol (ERGOCALCIFEROL) 50,000 unit Cap, Take 1 capsule (50,000 Units total) by mouth every 7 days., Disp: 8 capsule, Rfl: 0    Review of patient's allergies indicates:  No Known Allergies    Review of Systems   Constitutional: Negative.   Cardiovascular: Negative.   Respiratory: Negative.   Gastrointestinal: Negative.   Genitourinary: Negative.   Musculoskeletal: Negative.   Derm: Negative.  Allergic/Immunologic: Negative.   Endocrine: Negative.   Hematological: Negative.   Neurological: Negative, except as in HPI.   Psychiatric/Behavioral: Negative.     Objective:     /69 (BP Location: Left arm, Patient Position: Sitting, BP Method: Manual)  Pulse 87  Temp 98.1 °F (36.7 °C) (Tympanic)   Ht 5' 11" (1.803 m)  Wt 103.6 kg (228 lb 6.3 oz)  SpO2 97%  BMI 31.85 kg/m2    Physical Exam  GEN: A&O fully, NAD  PSYC: Normal affect  HEENT: OP: Clear, no LAD, poor dentition  CV: RRR, no M/G/R  PULM: CTA bilaterally, no wheezes, rales  GI: S/NT/ND, normal bowel sounds; colostomy bag/site WNL  EXT: No C/C/E    Lab Results   Component Value Date    WBC 2.92 (L) 03/17/2017    HGB 14.1 03/17/2017    HCT 42.4 03/17/2017     03/17/2017    CHOL 159 03/17/2017    TRIG 138 03/17/2017    HDL 46 03/17/2017    ALT 97 (H) 03/17/2017     (H) 03/17/2017     03/17/2017    K 4.1 03/17/2017     03/17/2017    CREATININE 1.0 03/17/2017    BUN 18 03/17/2017    CO2 24 03/17/2017 "    TSH 2.385 03/17/2017    PSA 0.67 03/17/2017    HGBA1C 6.3 (H) 03/17/2017     Lab Results   Component Value Date    HGBA1C 6.3 (H) 03/17/2017       Assessment:      1. Abnormal LFTs: Likely due to chemo. Will check today.    2. Morbid obesity due to excess calories: Discussed strategies for lifestyle modifications, including systematically (he is drinking 115 oz water/day, which is perfect!) increasing yogurt, whole fruits, unsalted nuts, non-starchy vegetables, beans, weight logging and physical activity; and avoiding potatoes, red/processed meats, sugar sweetened beverages, fast food, processed foods.    3. Essential hypertension: Stable. Continue current regimen.   4. Gastrostomy status: Stable. Continue for now.    5. Diabetes mellitus without complication: Controlled, but worse compared to previous. Encouraged to avoid eating sweets, rice, bread, pasta and potatoes.    6. Rectal cancer: s/p partial colectomy & colostomy, CT abn scans improving. Off chemo 2 months. Encouraged to avoid all red meat and processed meats.    7. Vitamin D deficiency: He prefers 1x/wk rather than qd vitD supplementation. Will tx.   8. Pancytopenia due to antineoplastic chemotherapy: Will check today.    9. Counseling on health promotion and disease prevention: Will administer PCV13 today and in 1 year will administer PCV23.     Plan:   Abnormal LFTs  -     ALT (SGPT); Future; Expected date: 5/17/17    Morbid obesity due to excess calories    Essential hypertension    Gastrostomy status    Diabetes mellitus without complication    Rectal cancer    Vitamin D deficiency  -     ergocalciferol (ERGOCALCIFEROL) 50,000 unit Cap; Take 1 capsule (50,000 Units total) by mouth every 7 days.  Dispense: 8 capsule; Refill: 0    Pancytopenia due to antineoplastic chemotherapy  -     CBC auto differential; Future; Expected date: 11/13/17    Counseling on health promotion and disease prevention  -     Pneumococcal Conjugate Vaccine (13 Valent)  (IM)      RTC in 3 months RE obesity or sooner as needed

## 2017-05-17 NOTE — MR AVS SNAPSHOT
[facility] Internal Medicine  [address] [place] [address]  Phone: [phone]                  Solo Cid   2017 10:00 AM   Office Visit    Description:  Male : 1950   Provider:  Jude Malagon MD   Department:  [facility] Internal Medicine           Reason for Visit     Follow-up           Diagnoses this Visit        Comments    Abnormal LFTs    -  Primary     Morbid obesity due to excess calories         Essential hypertension         Gastrostomy status         Diabetes mellitus without complication         Rectal cancer         Vitamin D deficiency         Pancytopenia due to antineoplastic chemotherapy         Counseling on health promotion and disease prevention                To Do List           Future Appointments        Provider Department Dept Phone    2017 11:50 AM LABORATORY, [facility] (5 Years of Data)     None      Follow-Up and Disposition     Return in about 3 months (around 2017), or if symptoms worsen or fail to improve, for FU on obesity, DM2.       These Medications        Disp Refills Start End    ergocalciferol (ERGOCALCIFEROL) 50,000 unit Cap 8 capsule 0 2017     Take 1 capsule (50,000 Units total) by mouth every 7 days. - Oral    Pharmacy: [facility] [address] [place] [address] [address] Ph #: [phone]         [facility] On Call     [facility] On [facility] Nurse Care Line -  Assistance  Unless otherwise directed by your provider, please contact [facility], our nurse care line that is available for  assistance.     Registered nurses in the [facility] On Call Center provide: appointment scheduling, clinical advisement, health education, and other advisory services.  Call: [phone] (toll free)               Medications           START taking these NEW medications        Refills    ergocalciferol (ERGOCALCIFEROL) 50,000 unit Cap 0    Sig:  "Take 1 capsule (50,000 Units total) by mouth every 7 days.    Class: Normal    Route: Oral           Verify that the below list of medications is an accurate representation of the medications you are currently taking.  If none reported, the list may be blank. If incorrect, please contact your healthcare provider. Carry this list with you in case of emergency.           Current Medications     hydrochlorothiazide (HYDRODIURIL) 12.5 MG Tab Take 1 tablet (12.5 mg total) by mouth once daily.    hydrocodone-acetaminophen 10-325mg (NORCO)  mg Tab     metformin (GLUCOPHAGE) 500 MG tablet Take 1 tablet (500 mg total) by mouth daily with breakfast.    omeprazole (PRILOSEC) 40 MG capsule     polyethylene glycol (GLYCOLAX) 17 gram/dose powder Take 17 g by mouth once daily.    tizanidine 4 mg Cap Take 4 mg by mouth.    ergocalciferol (ERGOCALCIFEROL) 50,000 unit Cap Take 1 capsule (50,000 Units total) by mouth every 7 days.           Clinical Reference Information           Your Vitals Were     BP Pulse Temp Height    132/69 (BP Location: Left arm, Patient Position: Sitting, BP Method: Manual) 87 98.1 °F (36.7 °C) (Tympanic) 5' 11" (1.803 m)    Weight SpO2 BMI    103.6 kg (228 lb 6.3 oz) 97% 31.85 kg/m2      Blood Pressure          Most Recent Value    BP  132/69      Allergies as of 5/17/2017     No Known Allergies      Immunizations Administered on Date of Encounter - 5/17/2017     Name Date Dose VIS Date Route    Pneumococcal Conjugate - 13 Valent  Incomplete 0.5 mL 11/5/2015 Intramuscular      Orders Placed During Today's Visit      Normal Orders This Visit    Pneumococcal Conjugate Vaccine (13 Valent) (IM)     Future Labs/Procedures Expected by Expires    ALT (SGPT)  5/17/2017 7/16/2018    VARICELLA ZOSTER ANTIBODY, IGG  5/17/2017 7/16/2018    CBC auto differential  11/13/2017 (Approximate) 7/16/2018      [facility] Sign-Up     Activating your [facility] account is as easy as 1-2-3!     1) Visit [email or web address], select " Sign Up Now, enter this activation code and your date of birth, then select Next.  Activation code not generated  Current Patient Portal Status: Account disabled      2) Create a username and password to use when you visit [facility] in the future and select a security question in case you lose your password and select Next.    3) Enter your e-mail address and click Sign Up!    Additional Information  If you have questions, please e-mail [email or web address] or call [phone] to talk to our [facility] staff. Remember, [facility] is NOT to be used for urgent needs. For medical emergencies, dial 911.         Language Assistance Services     ATTENTION: Language assistance services are available, free of charge. Please call [phone].      ATENCIÓN: Si delgadola galileaeva, tiene a vick disposición servicios gratuitos de asistencia lingüística. Llame al [phone].     CHÚ Ý: N?u b?n nói Ti?ng Vi?t, có các d?ch v? h? tr? ngôn ng? mi?n phí dành cho b?n. G?i s? [phone].         [place] [facility] complies with applicable Federal civil rights laws and does not discriminate on the basis of race, color, national origin, age, disability, or sex.

## 2017-05-18 LAB
VARICELLA INTERPRETATION: POSITIVE
VARICELLA ZOSTER IGG: 2.8 ISR

## 2017-10-10 ENCOUNTER — TELEPHONE (OUTPATIENT)
Dept: INTERNAL MEDICINE | Facility: CLINIC | Age: 67
End: 2017-10-10

## 2017-10-10 NOTE — TELEPHONE ENCOUNTER
----- Message from Noemy Norman sent at 10/10/2017 11:45 AM CDT -----  Contact: Brittnee CORTES   returning a missed call can be reached at [phone]///thxMW

## 2017-10-10 NOTE — TELEPHONE ENCOUNTER
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    Thanks  td

## 2017-10-11 NOTE — TELEPHONE ENCOUNTER
----- Message from Dai Mejias sent at 10/11/2017  9:43 AM CDT -----  Contact: Brittnee Uriostegui/ [employer]  [phone]  States that she is returning Joselyn's call. Please call back at [phone]//thank you acc

## 2017-10-11 NOTE — TELEPHONE ENCOUNTER
Left message notifying that this patient is not a patient of Dr. Kim. He is a patient of Dr. Malagon in [place]. Provided phone number for Brittnee to contact their office.

## 2017-10-11 NOTE — TELEPHONE ENCOUNTER
----- Message from Lilliam Norman sent at 10/11/2017  9:04 AM CDT -----  Contact: Geena Beaulieu returning Kiara's call. Please adv/call [phone].//thanks. cw

## 2017-10-11 NOTE — TELEPHONE ENCOUNTER
Attempted to speak with Brittnee at [facility] regarding patient. Patient is under the care of Dr. Malagon in [place]. Left voicemail for Brittnee to return call.

## 2017-10-24 DIAGNOSIS — E55.9 VITAMIN D DEFICIENCY: ICD-10-CM

## 2017-10-30 RX ORDER — ERGOCALCIFEROL 1.25 MG/1
CAPSULE ORAL
Qty: 8 CAPSULE | Refills: 0 | Status: SHIPPED | OUTPATIENT
Start: 2017-10-30

## 2017-11-21 ENCOUNTER — APPOINTMENT (OUTPATIENT)
Dept: RADIOLOGY | Facility: HOSPITAL | Age: 67
End: 2017-11-21
Attending: INTERNAL MEDICINE
Payer: MEDICARE

## 2017-11-21 ENCOUNTER — OFFICE VISIT (OUTPATIENT)
Dept: INTERNAL MEDICINE | Facility: CLINIC | Age: 67
End: 2017-11-21
Payer: MEDICARE

## 2017-11-21 VITALS
DIASTOLIC BLOOD PRESSURE: 74 MMHG | OXYGEN SATURATION: 98 % | BODY MASS INDEX: 28.92 KG/M2 | HEART RATE: 63 BPM | SYSTOLIC BLOOD PRESSURE: 126 MMHG | WEIGHT: 206.56 LBS | RESPIRATION RATE: 18 BRPM | HEIGHT: 71 IN | TEMPERATURE: 98 F

## 2017-11-21 DIAGNOSIS — K21.9 GASTROESOPHAGEAL REFLUX DISEASE, ESOPHAGITIS PRESENCE NOT SPECIFIED: ICD-10-CM

## 2017-11-21 DIAGNOSIS — M54.2 CERVICALGIA: ICD-10-CM

## 2017-11-21 DIAGNOSIS — Z71.89 COUNSELING ON HEALTH PROMOTION AND DISEASE PREVENTION: ICD-10-CM

## 2017-11-21 DIAGNOSIS — C20 RECTAL CANCER: Primary | ICD-10-CM

## 2017-11-21 DIAGNOSIS — I10 ESSENTIAL HYPERTENSION: ICD-10-CM

## 2017-11-21 DIAGNOSIS — E55.9 VITAMIN D DEFICIENCY: ICD-10-CM

## 2017-11-21 PROCEDURE — 72040 X-RAY EXAM NECK SPINE 2-3 VW: CPT | Mod: 26,,, | Performed by: RADIOLOGY

## 2017-11-21 PROCEDURE — 99999 PR PBB SHADOW E&M-EST. PATIENT-LVL V: CPT | Mod: PBBFAC,,, | Performed by: INTERNAL MEDICINE

## 2017-11-21 PROCEDURE — 72040 X-RAY EXAM NECK SPINE 2-3 VW: CPT | Mod: TC,PO

## 2017-11-21 PROCEDURE — 99214 OFFICE O/P EST MOD 30 MIN: CPT | Mod: S$GLB,,, | Performed by: INTERNAL MEDICINE

## 2017-11-21 PROCEDURE — 99499 UNLISTED E&M SERVICE: CPT | Mod: S$GLB,,, | Performed by: INTERNAL MEDICINE

## 2017-11-21 RX ORDER — HYDROCHLOROTHIAZIDE 12.5 MG/1
12.5 TABLET ORAL DAILY
Qty: 90 TABLET | Refills: 3 | Status: SHIPPED | OUTPATIENT
Start: 2017-11-21 | End: 2017-11-21 | Stop reason: SDUPTHER

## 2017-11-21 RX ORDER — HYDROCHLOROTHIAZIDE 12.5 MG/1
12.5 TABLET ORAL DAILY
Qty: 90 TABLET | Refills: 3 | Status: SHIPPED | OUTPATIENT
Start: 2017-11-21 | End: 2018-07-23 | Stop reason: ALTCHOICE

## 2017-11-21 NOTE — PROGRESS NOTES
Subjective:      Patient ID: Solo Cid is a 67 y.o. male.    Chief Complaint: Follow-up (6 MONTHS**) and Medication Refill      HPI  Mr. Cid is a patient of mine, who presents for f/u on HTN. He reports running out of his antihypertensives.     He also reports neck pains over the past 3-4 months, particularly when rotating his head bilaterally.     He reports his last chemo was 11/14/17 for his recurrent CRC.     Past Medical History:   Diagnosis Date    Abnormal LFTs     Arthritis     Back pain     Chronic bronchitis     Diabetes mellitus without complication     Gastrostomy status     Hypertension     Obesity     Peripheral neuropathy 03/08/2017    Noted by NP during preventive visit; oncologist, Dr. Siddiqui, is aware    Rectal cancer     s/p XRT/CTX (Oncologist: Dr. Gonzalez Siddiqui: [phone]/Abdominal Perineal Resection (Proctologist: Dr. Shivam Reynaga)    Visual disturbance 03/08/2017    bilateral blurry vision reported to NP on prevention visit     Past Surgical History:   Procedure Laterality Date    CERVICAL FUSION      COLOSTOMY  2015    MEDIPORT INSERTION, SINGLE  2015     Social History     Social History    Marital status: Single     Spouse name: N/A    Number of children: N/A    Years of education: N/A     Occupational History    Not on file.     Social History Main Topics    Smoking status: Former Smoker     Quit date: 1/21/2015    Smokeless tobacco: Never Used    Alcohol use No    Drug use: No    Sexual activity: Not Currently     Other Topics Concern    Not on file     Social History Narrative    No narrative on file     Family History   Problem Relation Age of Onset    Hypertension Mother     Diabetes Mother     Kidney disease Father      Dialysis    Hypertension Father     Stroke Father     Hypertension Sister     Heart attack Brother     Heart attack Brother     Cancer Neg Hx        Current Outpatient Prescriptions:     ergocalciferol (ERGOCALCIFEROL)  "50,000 unit Cap, TAKE 1 CAPSULE BY MOUTH EVERY 7 DAYS, Disp: 8 capsule, Rfl: 0    hydrocodone-acetaminophen 10-325mg (NORCO)  mg Tab, , Disp: , Rfl: 0    metformin (GLUCOPHAGE) 500 MG tablet, Take 1 tablet (500 mg total) by mouth daily with breakfast., Disp: 30 tablet, Rfl: 11    omeprazole (PRILOSEC) 40 MG capsule, , Disp: , Rfl: 3    polyethylene glycol (GLYCOLAX) 17 gram/dose powder, Take 17 g by mouth once daily., Disp: , Rfl:     tizanidine 4 mg Cap, Take 4 mg by mouth., Disp: , Rfl:     hydroCHLOROthiazide (HYDRODIURIL) 12.5 MG Tab, Take 1 tablet (12.5 mg total) by mouth once daily., Disp: 90 tablet, Rfl: 3    ranitidine (ZANTAC) 150 MG tablet, TAKE 1 TABLET(150 MG) BY MOUTH TWICE DAILY, Disp: 180 tablet, Rfl: 1    Review of patient's allergies indicates:  No Known Allergies    Review of Systems   Negative.     Objective:     /74 (BP Location: Right arm, Patient Position: Sitting, BP Method: Large (Manual))   Pulse 63   Temp 97.7 °F (36.5 °C) (Tympanic)   Resp 18   Ht 5' 11" (1.803 m)   Wt 93.7 kg (206 lb 9.1 oz)   SpO2 98%   BMI 28.81 kg/m²     Physical Exam  GEN: A&O fully, NAD  PSYC: Normal affect  HEENT: OP: Clear, no LAD, no thyroid masses, no neck TTP  DERM: Posterior neck: midline-cervical     Lab Results   Component Value Date    WBC 5.04 05/17/2017    HGB 14.9 05/17/2017    HCT 44.8 05/17/2017     05/17/2017    CHOL 159 03/17/2017    TRIG 138 03/17/2017    HDL 46 03/17/2017    ALT 45 (H) 05/17/2017     (H) 03/17/2017     03/17/2017    K 4.1 03/17/2017     03/17/2017    CREATININE 1.0 03/17/2017    BUN 18 03/17/2017    CO2 24 03/17/2017    TSH 2.385 03/17/2017    PSA 0.67 03/17/2017    HGBA1C 6.3 (H) 03/17/2017       IMAGING from [facility]:  Result Impression   Mild increase in size of all pulmonary metastatic nodules.       CT ABDOMEN AND PELVIS WITH CONTRAST     HISTORY: Rectal cancer     COMPARISON: 4/7/2017     TECHNIQUE: Axial images were obtained " from the lung bases to the ischial tuberosities following uneventful administration of 100 ml Omnipaque 300. Oral contrast was given. Delayed imaging was obtained. Automated exposure control was used for dose reduction.     FINDINGS: There are no suspicious liver lesions. The pancreas and adrenal glands are normal. The spleen is normal in size. The kidneys have symmetric contours without mass, calculus, or hydronephrosis. The abdominal aorta is normal in caliber. There is no mesenteric or retroperitoneal lymphadenopathy. Left lower quadrant colostomy is unchanged in appearance. The urinary bladder is normal. Presacral soft tissue density is stable. No suspicious bone lesions.     IMPRESSION:   1. No change in the abdomen and pelvis since last exam.    Result Narrative     CT CHEST WITH CONTRAST     HISTORY: Rectal cancer     COMPARISON: 4/7/2017     TECHNIQUE: Axial images were obtained from the thoracic inlet through the lung bases following uneventful administration of 100 ml Omnipaque 300. Automated exposure control was used for dose reduction.     FINDINGS: The cardiac size is normal. There is no mediastinal, hilar, or axillary lymphadenopathy. There is mild increase in size of the metastatic pulmonary nodules compared to the last exam.. The right middle lobe nodule measures 11 mm compared to 9 mm, right lower lobe nodule 24 x 23 mm compared to 22 x 21 mm, superior segment left lower lobe nodule 9 mm previously 8 mm, anterior left lower lobe nodule 10 mm previously 7 mm, 2 nodules in the posterior left lower lobe the largest 13 mm previously 11 mm and 10 mm previously 6 mm, and an inferior 10 mm left lower lobe nodule was 8 mm. There is no pleural or pericardial effusion. No suspicious bone lesions.          Assessment:      1. Essential hypertension: Well controlled. Will renew his HCTZ 12.5 mg po qd.    2.      GERD: Regarding your acid reflux, this can lead to ulcers over time. I recommend you avoid:     1.  Tomato sauces i.e. Spaghetti, pizza, lasagna, etc.     2. Large or late meals     3. Caffeine i.e. Coffee, chocolate, etc.     4. Spicy foods     5. Alcoholic beverages    I also recommend you increase your water intake to 6-8 glasses/day and foods high in fiber i.e. whole apples.   Once you have successfully done the above for two weeks, you may consider decreasing your omeprazole   (Prilisec) proton pump inhibitor dose by 50% every week (i.e. cutting a tablet in half &/or taking every other day)   while supplementing Zantac (ranitidine) 150 mg by mouth twice daily as needed.   3.    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 Will check CMP.   4.    HM: Given his exposure to varicella virus, he is at risk for shingles. Encouraged him to have the shingles          vax once he has completed chemo at his [facility].     Plan:   Rectal cancer  -     Comprehensive metabolic panel; Future; Expected date: 11/21/2017  -     CBC auto differential; Future; Expected date: 11/21/2017    Essential hypertension  -     Discontinue: hydroCHLOROthiazide (HYDRODIURIL) 12.5 MG Tab; Take 1 tablet (12.5 mg total) by mouth once daily.  Dispense: 90 tablet; Refill: 3  -     hydroCHLOROthiazide (HYDRODIURIL) 12.5 MG Tab; Take 1 tablet (12.5 mg total) by mouth once daily.  Dispense: 90 tablet; Refill: 3    Gastroesophageal reflux disease, esophagitis presence not specified  -     Discontinue: ranitidine (ZANTAC) 150 MG tablet; Take 1 tablet (150 mg total) by mouth 2 (two) times daily.  Dispense: 60 tablet; Refill: 1    Counseling on health promotion and disease prevention  -     Influenza - High Dose (65+) (PF) (IM)  -     US Abdominal Aorta; Future; Expected date: 11/21/2017    Cervicalgia  -     X-Ray Cervical Spine AP And Lateral; Future; Expected date: 11/21/2017  -     Ambulatory consult to Physical Therapy    Vitamin D deficiency  -     Vitamin D; Future;  Expected date: 05/20/2018      RTC in 3 months RE CRC, GERD, cervicalgia or sooner as needed      ADDENDUM:  Multi-level DJD of cervical spine on x-ray today, for which I recommend physical therapy, tylenol 650 mg by mouth three times daily and   ibuprofen 400 mg 1-2 tabs daily as needed for breakthrough pain. Thus, will order PT for cervicalgia.

## 2017-11-28 ENCOUNTER — TELEPHONE (OUTPATIENT)
Dept: RADIOLOGY | Facility: HOSPITAL | Age: 67
End: 2017-11-28

## 2017-12-01 ENCOUNTER — LAB VISIT (OUTPATIENT)
Dept: LAB | Facility: HOSPITAL | Age: 67
End: 2017-12-01
Attending: INTERNAL MEDICINE
Payer: MEDICARE

## 2017-12-01 DIAGNOSIS — C20 RECTAL CANCER: ICD-10-CM

## 2017-12-01 DIAGNOSIS — E55.9 VITAMIN D DEFICIENCY: ICD-10-CM

## 2017-12-01 DIAGNOSIS — E11.9 TYPE 2 DIABETES MELLITUS WITHOUT COMPLICATION: ICD-10-CM

## 2017-12-01 LAB
25(OH)D3+25(OH)D2 SERPL-MCNC: 19 NG/ML
ALBUMIN SERPL BCP-MCNC: 2.8 G/DL
ALP SERPL-CCNC: 70 U/L
ALT SERPL W/O P-5'-P-CCNC: 102 U/L
ANION GAP SERPL CALC-SCNC: 8 MMOL/L
AST SERPL-CCNC: 99 U/L
BASOPHILS # BLD AUTO: 0.03 K/UL
BASOPHILS NFR BLD: 0.9 %
BILIRUB SERPL-MCNC: 0.6 MG/DL
BUN SERPL-MCNC: 26 MG/DL
CALCIUM SERPL-MCNC: 9.5 MG/DL
CHLORIDE SERPL-SCNC: 106 MMOL/L
CO2 SERPL-SCNC: 30 MMOL/L
CREAT SERPL-MCNC: 0.8 MG/DL
DIFFERENTIAL METHOD: ABNORMAL
EOSINOPHIL # BLD AUTO: 0.1 K/UL
EOSINOPHIL NFR BLD: 2.3 %
ERYTHROCYTE [DISTWIDTH] IN BLOOD BY AUTOMATED COUNT: 13.3 %
EST. GFR  (AFRICAN AMERICAN): >60 ML/MIN/1.73 M^2
EST. GFR  (NON AFRICAN AMERICAN): >60 ML/MIN/1.73 M^2
GLUCOSE SERPL-MCNC: 126 MG/DL
HCT VFR BLD AUTO: 42.2 %
HGB BLD-MCNC: 13.9 G/DL
IMM GRANULOCYTES # BLD AUTO: 0.01 K/UL
IMM GRANULOCYTES NFR BLD AUTO: 0.3 %
LYMPHOCYTES # BLD AUTO: 1.3 K/UL
LYMPHOCYTES NFR BLD: 36.6 %
MCH RBC QN AUTO: 32.9 PG
MCHC RBC AUTO-ENTMCNC: 32.9 G/DL
MCV RBC AUTO: 100 FL
MONOCYTES # BLD AUTO: 0.3 K/UL
MONOCYTES NFR BLD: 7.7 %
NEUTROPHILS # BLD AUTO: 1.8 K/UL
NEUTROPHILS NFR BLD: 52.2 %
NRBC BLD-RTO: 0 /100 WBC
PLATELET # BLD AUTO: 160 K/UL
PMV BLD AUTO: 10.5 FL
POTASSIUM SERPL-SCNC: 4.2 MMOL/L
PROT SERPL-MCNC: 6.6 G/DL
RBC # BLD AUTO: 4.23 M/UL
SODIUM SERPL-SCNC: 144 MMOL/L
WBC # BLD AUTO: 3.52 K/UL

## 2017-12-01 PROCEDURE — 85025 COMPLETE CBC W/AUTO DIFF WBC: CPT

## 2017-12-01 PROCEDURE — 36415 COLL VENOUS BLD VENIPUNCTURE: CPT | Mod: PO

## 2017-12-01 PROCEDURE — 80053 COMPREHEN METABOLIC PANEL: CPT

## 2017-12-01 PROCEDURE — 82306 VITAMIN D 25 HYDROXY: CPT

## 2017-12-07 ENCOUNTER — TELEPHONE (OUTPATIENT)
Dept: RADIOLOGY | Facility: HOSPITAL | Age: 67
End: 2017-12-07

## 2018-01-03 ENCOUNTER — TELEPHONE (OUTPATIENT)
Dept: INTERNAL MEDICINE | Facility: CLINIC | Age: 68
End: 2018-01-03

## 2018-01-03 NOTE — TELEPHONE ENCOUNTER
----- Message from Radha Gutierrez sent at 1/3/2018  7:39 AM CST -----  Contact: self   Would like to consult with nurse regarding a needed verification for bags and also would like t speak with nurse regarding medication. Please call back supplier for bags at [phone].Please call back patient at [phone].      Thanks,  Radha Gutierrez

## 2018-01-03 NOTE — TELEPHONE ENCOUNTER
Spoke with Lila, gave verbal order for ostomy drainable bags, stomy powder, and ostomy wipes. Requested them send over a written ordered via fax to have Manas signed. Stated it would be on the way.

## 2018-01-03 NOTE — TELEPHONE ENCOUNTER
----- Message from Smita Gallo sent at 1/3/2018 11:15 AM CST -----  Contact: Lila/[facility]  States she needs to speak to the nurse regarding a verbal order for his osteomy supplies. Please call Lila at [phone]. Thank you

## 2018-02-06 ENCOUNTER — PES CALL (OUTPATIENT)
Dept: ADMINISTRATIVE | Facility: CLINIC | Age: 68
End: 2018-02-06

## 2018-03-08 ENCOUNTER — OFFICE VISIT (OUTPATIENT)
Dept: FAMILY MEDICINE | Facility: CLINIC | Age: 68
End: 2018-03-08
Payer: MEDICARE

## 2018-03-08 ENCOUNTER — TELEPHONE (OUTPATIENT)
Dept: UROLOGY | Facility: CLINIC | Age: 68
End: 2018-03-08

## 2018-03-08 VITALS
DIASTOLIC BLOOD PRESSURE: 70 MMHG | TEMPERATURE: 97 F | BODY MASS INDEX: 29.56 KG/M2 | HEART RATE: 60 BPM | WEIGHT: 211.19 LBS | SYSTOLIC BLOOD PRESSURE: 140 MMHG | OXYGEN SATURATION: 97 % | HEIGHT: 71 IN

## 2018-03-08 DIAGNOSIS — E11.9 DIABETES MELLITUS WITHOUT COMPLICATION: ICD-10-CM

## 2018-03-08 DIAGNOSIS — E55.9 VITAMIN D DEFICIENCY: ICD-10-CM

## 2018-03-08 DIAGNOSIS — Z00.00 ENCOUNTER FOR PREVENTIVE HEALTH EXAMINATION: Primary | ICD-10-CM

## 2018-03-08 DIAGNOSIS — G57.93 NEUROPATHY OF BOTH FEET: ICD-10-CM

## 2018-03-08 DIAGNOSIS — C78.00 MALIGNANT NEOPLASM METASTATIC TO LUNG, UNSPECIFIED LATERALITY: ICD-10-CM

## 2018-03-08 DIAGNOSIS — I10 ESSENTIAL HYPERTENSION: ICD-10-CM

## 2018-03-08 DIAGNOSIS — C20 RECTAL CANCER: ICD-10-CM

## 2018-03-08 DIAGNOSIS — G89.3 CHRONIC PAIN DUE TO NEOPLASM: ICD-10-CM

## 2018-03-08 DIAGNOSIS — D12.6 TUBULAR ADENOMA OF COLON: ICD-10-CM

## 2018-03-08 DIAGNOSIS — Z93.1 GASTROSTOMY STATUS: ICD-10-CM

## 2018-03-08 DIAGNOSIS — F11.20 UNCOMPLICATED OPIOID DEPENDENCE: ICD-10-CM

## 2018-03-08 DIAGNOSIS — R39.198 DECREASED URINE STREAM: ICD-10-CM

## 2018-03-08 DIAGNOSIS — R79.89 ABNORMAL LFTS: ICD-10-CM

## 2018-03-08 DIAGNOSIS — K21.9 GASTROESOPHAGEAL REFLUX DISEASE WITHOUT ESOPHAGITIS: ICD-10-CM

## 2018-03-08 PROBLEM — H53.9 VISUAL DISTURBANCE: Status: RESOLVED | Noted: 2017-03-17 | Resolved: 2018-03-08

## 2018-03-08 PROBLEM — G89.29 CHRONIC PAIN: Status: ACTIVE | Noted: 2018-03-08

## 2018-03-08 PROCEDURE — 99999 PR PBB SHADOW E&M-EST. PATIENT-LVL IV: CPT | Mod: PBBFAC,,, | Performed by: NURSE PRACTITIONER

## 2018-03-08 PROCEDURE — 99499 UNLISTED E&M SERVICE: CPT | Mod: S$GLB,,, | Performed by: INTERNAL MEDICINE

## 2018-03-08 PROCEDURE — G0402 INITIAL PREVENTIVE EXAM: HCPCS | Mod: S$GLB,,, | Performed by: NURSE PRACTITIONER

## 2018-03-08 PROCEDURE — 99499 UNLISTED E&M SERVICE: CPT | Mod: S$GLB,,, | Performed by: NURSE PRACTITIONER

## 2018-03-08 NOTE — PATIENT INSTRUCTIONS
Counseling and Referral of Other Preventative  (Italic type indicates deductible and co-insurance are waived)    Patient Name: Solo Cid  Today's Date: 3/8/2018    Health Maintenance       Date Due Completion Date    Low Dose Statin 06/05/1973 ---    Abdominal Aortic Aneurysm Screening 06/05/2017 ---    Influenza Vaccine 08/01/2017 ---    Hemoglobin A1c 11/17/2017 5/17/2017    Foot Exam 04/01/2018 4/1/2017 (Done)    Override on 4/1/2017: Done (Seen by podiatry: Neuropathy)    Override on 8/30/2016: Done (Sole sensation to fiber: L: 1 of 4 points (only at 1st tarsal); R 3 of 4 (not mid foot))    Lipid Panel 03/17/2018 3/17/2017    Pneumococcal (65+) (2 of 2 - PPSV23) 05/17/2018 5/17/2017    Eye Exam 05/17/2018 5/17/2017 (Done)    Override on 5/17/2017: Done (DR. KARLA DENSON. No Diabetic Retinopathy)    Urine Microalbumin 05/17/2018 5/17/2017    TETANUS VACCINE 08/30/2026 8/30/2016        No orders of the defined types were placed in this encounter.    The following information is provided to all patients.  This information is to help you find resources for any of the problems found today that may be affecting your health:                Living healthy guide: www.[facility].[place].gov      Understanding Diabetes: www.diabetes.org      Eating healthy: www.cdc.gov/healthyweight      CDC home safety checklist: www.cdc.gov/steadi/patient.html      Agency on Aging: www.goea.[place].gov      Alcoholics anonymous (AA): www.aa.org      Physical Activity: www.yayo.nih.gov/ek7hknw      Tobacco use: www.quitwithusla.org       Counseling and Referral of Other Preventative  (Italic type indicates deductible and co-insurance are waived)    Patient Name: Solo Cid  Today's Date: 3/8/2018    Health Maintenance       Date Due Completion Date    Low Dose Statin 06/05/1973 ---    Abdominal Aortic Aneurysm Screening 06/05/2017 ---    Influenza Vaccine 08/01/2017 ---    Hemoglobin A1c 11/17/2017 5/17/2017    Foot Exam  04/01/2018 4/1/2017 (Done)    Override on 4/1/2017: Done (Seen by podiatry: Neuropathy)    Override on 8/30/2016: Done (Sole sensation to fiber: L: 1 of 4 points (only at 1st tarsal); R 3 of 4 (not mid foot))    Lipid Panel 03/17/2018 3/17/2017    Pneumococcal (65+) (2 of 2 - PPSV23) 05/17/2018 5/17/2017    Eye Exam 05/17/2018 5/17/2017 (Done)    Override on 5/17/2017: Done (DR. KARLA DENSON. No Diabetic Retinopathy)    Urine Microalbumin 05/17/2018 5/17/2017    TETANUS VACCINE 08/30/2026 8/30/2016        No orders of the defined types were placed in this encounter.    The following information is provided to all patients.  This information is to help you find resources for any of the problems found today that may be affecting your health:                Living healthy guide: www.[facility].[place].[facility]      Understanding Diabetes: www.diabetes.org      Eating healthy: www.cdc.gov/healthyweight      CDC home safety checklist: www.cdc.gov/steadi/patient.html      Agency on Aging: www.goea.[place].[facility]      Alcoholics anonymous (AA): www.aa.org      Physical Activity: www.yayo.nih.gov/os9vsil      Tobacco use: www.quitwithusla.org

## 2018-03-08 NOTE — PROGRESS NOTES
"Solo Cid presented for a  Medicare AWV and comprehensive Health Risk Assessment today. The following components were reviewed and updated:    · Medical history  · Family History  · Social history  · Allergies and Current Medications  · Health Risk Assessment  · Health Maintenance  · Care Team     ** See Completed Assessments for Annual Wellness Visit within the encounter summary.**       The following assessments were completed:  · Living Situation  · CAGE  · Depression Screening  · Timed Get Up and Go  · Whisper Test  · Cognitive Function Screening  · Nutrition Screening  · ADL Screening  · PAQ Screening    Vitals:    03/08/18 0934   BP: (!) 140/70   BP Location: Right arm   Patient Position: Sitting   Pulse: 60   Temp: 96.6 °F (35.9 °C)   TempSrc: Tympanic   SpO2: 97%   Weight: 95.8 kg (211 lb 3.2 oz)   Height: 5' 11" (1.803 m)     Body mass index is 29.46 kg/m².  Physical Exam   Constitutional: He appears well-developed. No distress.   HENT:   Head: Normocephalic and atraumatic.   Eyes: Pupils are equal, round, and reactive to light.   Neck: Carotid bruit is not present.   Cardiovascular: Normal rate, regular rhythm, normal heart sounds, intact distal pulses and normal pulses.  Exam reveals no gallop.    No murmur heard.  Pulmonary/Chest: Effort normal and breath sounds normal.   Abdominal: Soft. Bowel sounds are normal. He exhibits no distension. There is no tenderness.   LLQ coloistomy   Musculoskeletal: Normal range of motion. He exhibits no edema.   Neurological: He exhibits normal muscle tone.   Skin: Skin is warm, dry and intact.   Psychiatric: He has a normal mood and affect. His speech is normal and behavior is normal. Judgment and thought content normal. Cognition and memory are normal.   Nursing note and vitals reviewed.        Diagnoses and health risks identified today and associated recommendations/orders:    1. Encounter for preventive health examination      2. Rectal cancer S/P " colostomy  Clinical stage IIIB, [id number], Date of diagnosis 4/30/14.  Chronic and Ongoing Stage IV. S/P- Abdominoperineal resection 5/29/15  And  Radiation.   Restarted Chemotherapy  X 2 weeks ago- goes LÖWE   Continue current treatment plan as previously prescribed with your outside [facility]  Oncologist -     3. Malignant neoplasm metastatic to lung, unspecified laterality  Chronic and Ongoing.  Surveillance CT of chest, and & pelvis - mult pulmonary nodules LULL 1.4, LELL 1.2, RML 1.2, REUL mm   Recurrence 9/15/16 - Needle Bx of lung nodule C/aw met adenocarcinoma from rectum; KR AS mutated; BR AF mutation not detected  Continue current treatment plan as previously prescribed with your outside [facility]  Oncologist -     4. Gastrostomy status  Chronic and Stable.  S/CP LLQ colostomy 2/2 rectal cancer 2015. . Continue current treatment plan as previously prescribed with your colon rectal cancer specialist- Dr. Siddiqui.     5. Uncomplicated opioid dependence  Chronic and Stable. Pt on Nor co  As prescribed due chronic pain 2/2 to rectal cancer Continue current treatment plan as previously prescribed with your oncologists    6. Chronic pain due to neoplasm  Chronic and Stable. Pt on [place]  As prescribed due chronic pain 2/2 to rectal cancer. Continue current treatment plan as previously prescribed with your oncologists    7. Neuropathy of both feet  Chronic and Ongoing. Pt states no acute changes - due to chemotherapy. Denies the need for medications. Pt states feet feels num but still has sensations Continue current treatment plan as previously prescribed with your oncologist     9. Diabetes mellitus without complication  Component      Latest Ref Rng & Units 3/17/2017   Hemoglobin A1C      4.5 - 6.2 % 6.3 (H)   Estimated Avg Glucose      68 - 131 mg/dL 134 (H)   Stable and controlled on Glucophage daily with diet modification. Continue current treatment plan as previously prescribed with your PCP-  [facility]    10. Essential hypertension  Stable and controlled HCTZ  And Tizanidine. Continue current treatment plan as previously prescribed with your PCP.      11. Gastroesophageal reflux disease without esophagitis  Stable and controlled on Priolesc daily  Continue current treatment plan as previously prescribed with your PCP.     12. Abnormal LFT  Component      Latest Ref Rng & Units 12/1/2017 5/17/2017   AST      10 - 40 U/L 99 (H)    ALT      10 - 44 U/L 102 (H) 45 (H)   Chronic and Stable  CT scan 12/6/ 2017 in care everywhere:The liver is without mass or biliary duct distention. No hx of hepatitis or alcholol use. Followed by PCP and onlocolgist       13 Vitamin D deficiency   12/1/2017    19 (L)   Stable and controlled on Vitamin D daily . Continue current treatment plan as previously prescribed with your PCP.     14 Decreased urine stream  This is a new problem that has been identified during today's visit.  . Pt reports urine flow is slow and hesitancy-worsen in last yr- denies pain or discharge. PSA up to date and wnl. Refer to urologist for evaluation    I offered to discuss end of life issues, including information on how to make advance directives that the patient could use to name someone who would make medical decisions on their behalf if they became too ill to make themselves.  _X_Patient is interested, I provided paper work and offered to discuss.    Provided Solo with a 5-10 year written screening schedule and personal prevention plan. Recommendations were developed using the USPSTF age appropriate recommendations. Education, counseling, and referrals were provided as needed. After Visit Summary printed and given to patient which includes a list of additional screenings\tests needed.    Follow-up in about 1 year (around 3/8/2019).    OBIE Collins

## 2018-03-23 DIAGNOSIS — E11.9 TYPE 2 DIABETES MELLITUS WITHOUT COMPLICATION: ICD-10-CM

## 2018-05-04 ENCOUNTER — TELEPHONE (OUTPATIENT)
Dept: RADIOLOGY | Facility: HOSPITAL | Age: 68
End: 2018-05-04

## 2018-05-07 ENCOUNTER — OFFICE VISIT (OUTPATIENT)
Dept: UROLOGY | Facility: CLINIC | Age: 68
End: 2018-05-07
Payer: MEDICARE

## 2018-05-07 VITALS — BODY MASS INDEX: 27.64 KG/M2 | WEIGHT: 198.19 LBS

## 2018-05-07 DIAGNOSIS — N40.0 BENIGN PROSTATIC HYPERPLASIA, UNSPECIFIED WHETHER LOWER URINARY TRACT SYMPTOMS PRESENT: Primary | ICD-10-CM

## 2018-05-07 DIAGNOSIS — R31.9 HEMATURIA, UNSPECIFIED TYPE: ICD-10-CM

## 2018-05-07 LAB
BILIRUB SERPL-MCNC: NORMAL MG/DL
BLOOD URINE, POC: 250
COLOR, POC UA: NORMAL
GLUCOSE UR QL STRIP: NORMAL
KETONES UR QL STRIP: NORMAL
LEUKOCYTE ESTERASE URINE, POC: NORMAL
MICROSCOPIC COMMENT: ABNORMAL
NITRITE, POC UA: NORMAL
PH, POC UA: 6
POC RESIDUAL URINE VOLUME: 18 ML (ref 0–100)
PROTEIN, POC: NORMAL
RBC #/AREA URNS AUTO: 19 /HPF (ref 0–4)
SPECIFIC GRAVITY, POC UA: 1.02
SQUAMOUS #/AREA URNS AUTO: 0 /HPF
UROBILINOGEN, POC UA: NORMAL
WBC #/AREA URNS AUTO: 1 /HPF (ref 0–5)

## 2018-05-07 PROCEDURE — 81002 URINALYSIS NONAUTO W/O SCOPE: CPT | Mod: S$GLB,,, | Performed by: UROLOGY

## 2018-05-07 PROCEDURE — 81001 URINALYSIS AUTO W/SCOPE: CPT

## 2018-05-07 PROCEDURE — 51798 US URINE CAPACITY MEASURE: CPT | Mod: S$GLB,,, | Performed by: UROLOGY

## 2018-05-07 PROCEDURE — 3008F BODY MASS INDEX DOCD: CPT | Mod: CPTII,S$GLB,, | Performed by: UROLOGY

## 2018-05-07 PROCEDURE — 99204 OFFICE O/P NEW MOD 45 MIN: CPT | Mod: 25,S$GLB,, | Performed by: UROLOGY

## 2018-05-07 PROCEDURE — 99499 UNLISTED E&M SERVICE: CPT | Mod: S$GLB,,, | Performed by: UROLOGY

## 2018-05-07 PROCEDURE — 87086 URINE CULTURE/COLONY COUNT: CPT

## 2018-05-07 PROCEDURE — 99999 PR PBB SHADOW E&M-EST. PATIENT-LVL I: CPT | Mod: PBBFAC,,, | Performed by: UROLOGY

## 2018-05-07 RX ORDER — TAMSULOSIN HYDROCHLORIDE 0.4 MG/1
0.4 CAPSULE ORAL DAILY
Qty: 30 CAPSULE | Refills: 11 | Status: SHIPPED | OUTPATIENT
Start: 2018-05-07 | End: 2018-06-07 | Stop reason: SDUPTHER

## 2018-05-07 NOTE — PROGRESS NOTES
Chief Complaint: LUTS    HPI:   5/7/18: 66 yo man with reduced sometimes intermittent stream.  Has to force a stream and takes a very long time.  Has a colostomy from resection of colon cancer with lung metastases managed by Dr. Bass. No sig abd/pelvic pain and no exac/rel factors.  No hematuria.  No urolithiasis.  Had a rectal resection.    Allergies:  Patient has no known allergies.    Medications:  has a current medication list which includes the following prescription(s): ergocalciferol, hydrochlorothiazide, hydrocodone-acetaminophen 10-325mg, metformin, omeprazole, polyethylene glycol, ranitidine, and tizanidine.    Review of Systems:  General: No fever, chills, fatigability, or weight loss.  Skin: No rashes, itching, or changes in color or texture of skin.  Chest: Denies PEREZ, cyanosis, wheezing, cough, and sputum production.  Abdomen: Appetite fine. No weight loss. Denies diarrhea, abdominal pain, hematemesis, or blood in stool.  Musculoskeletal: No joint stiffness or swelling. Denies back pain.  : As above.  All other review of systems negative.    PMH:   has a past medical history of Abnormal LFTs; Arthritis; Back pain; Chronic bronchitis; Diabetes mellitus without complication; Gastrostomy status; Hypertension; Obesity; Peripheral neuropathy (03/08/2017); Rectal cancer; and Visual disturbance (03/08/2017).    PSH:   has a past surgical history that includes Cervical fusion; Colostomy (2015); and Mediport insertion, single (2015).    FamHx: family history includes Diabetes in his mother; Heart attack in his brother and brother; Hypertension in his father, mother, and sister; Kidney disease in his father; Stroke in his father.    SocHx:  reports that he quit smoking about 3 years ago. He has never used smokeless tobacco. He reports that he does not drink alcohol or use drugs.      Physical Exam:  There were no vitals filed for this visit.  General: A&Ox3, no apparent distress, no deformities  Neck: No  masses, normal thyroid  Lungs: normal inspiration, no use of accessory muscles  Heart: normal pulse, no arrhythmias  Abdomen: Soft, NT, ND, no masses, no hernias, no hepatosplenomegaly.  Colostomy  Lymphatic: Neck and groin nodes negative  Skin: The skin is warm and dry. No jaundice.  Ext: No c/c/e.  : Test desc samson, no abnormalities of epididymus. Penis normal, with normal penile and scrotal skin. Meatus normal. No anus.    Labs/Studies:   Urinalysis performed in clinic, summary: UA normal exc 250 blood  PSA    3/17: 0.7    Impression/Plan:   1. UA/UCx today and Ct Urogram/cysto if hematuria confirmed.  2. Flomax for LUTS  3. RTC 1 mo

## 2018-05-07 NOTE — LETTER
May 7, 2018      María Norman NP  [address] [place] [address] Dr Margie [place] [id number]           [facility]  [address] [place] [address] Sou[address] [place] [address]  Phone: [phone]  Fax: [phone]          Patient: Solo Cid   MR Number: [id number]   YOB: 1950   Date of Visit: 5/7/2018       Dear María Norman:    Thank you for referring Solo Cid to me for evaluation. Attached you will find relevant portions of my assessment and plan of care.    If you have questions, please do not hesitate to call me. I look forward to following Solo Cid along with you.    Sincerely,    Jose Cruz Mccormack IV, MD    Enclosure  CC:  No Recipients    If you would like to receive this communication electronically, please contact [email or web address] or [phone] to request more information on [employer] access.    For providers and/or their staff who would like to refer a patient to [facility], please contact us through our one-stop-shop provider referral line, [facility], at [phone].    If you feel you have received this communication in error or would no longer like to receive these types of communications, please e-mail [email or web address]

## 2018-05-08 ENCOUNTER — TELEPHONE (OUTPATIENT)
Dept: UROLOGY | Facility: CLINIC | Age: 68
End: 2018-05-08

## 2018-05-08 DIAGNOSIS — R31.9 HEMATURIA, UNSPECIFIED TYPE: ICD-10-CM

## 2018-05-08 LAB — BACTERIA UR CULT: NO GROWTH

## 2018-05-08 NOTE — TELEPHONE ENCOUNTER
Notified patient of urinalysis results. Changed follow up appointment to cystoscopy. Patient states he will call back to schedule CT scan.

## 2018-05-31 ENCOUNTER — TELEPHONE (OUTPATIENT)
Dept: RADIOLOGY | Facility: HOSPITAL | Age: 68
End: 2018-05-31

## 2018-06-01 ENCOUNTER — HOSPITAL ENCOUNTER (OUTPATIENT)
Dept: RADIOLOGY | Facility: HOSPITAL | Age: 68
Discharge: HOME | End: 2018-06-01
Attending: UROLOGY
Payer: MEDICARE

## 2018-06-01 DIAGNOSIS — R31.9 HEMATURIA, UNSPECIFIED TYPE: ICD-10-CM

## 2018-06-01 PROCEDURE — 25500020 PHARM REV CODE 255: Mod: PO | Performed by: UROLOGY

## 2018-06-01 PROCEDURE — 74178 CT ABD&PLV WO CNTR FLWD CNTR: CPT | Mod: 26,,, | Performed by: RADIOLOGY

## 2018-06-01 PROCEDURE — 74178 CT ABD&PLV WO CNTR FLWD CNTR: CPT | Mod: TC,PO

## 2018-06-01 RX ADMIN — IOHEXOL 120 ML: 350 INJECTION, SOLUTION INTRAVENOUS at 10:06

## 2018-06-07 ENCOUNTER — OFFICE VISIT (OUTPATIENT)
Dept: UROLOGY | Facility: CLINIC | Age: 68
End: 2018-06-07
Payer: MEDICARE

## 2018-06-07 VITALS — WEIGHT: 198.19 LBS | HEIGHT: 71 IN | BODY MASS INDEX: 27.75 KG/M2

## 2018-06-07 DIAGNOSIS — N40.0 BENIGN PROSTATIC HYPERPLASIA, UNSPECIFIED WHETHER LOWER URINARY TRACT SYMPTOMS PRESENT: ICD-10-CM

## 2018-06-07 DIAGNOSIS — R31.9 HEMATURIA, UNSPECIFIED TYPE: Primary | ICD-10-CM

## 2018-06-07 LAB
BILIRUB SERPL-MCNC: NORMAL MG/DL
BLOOD URINE, POC: NORMAL
COLOR, POC UA: YELLOW
GLUCOSE UR QL STRIP: NORMAL
KETONES UR QL STRIP: NORMAL
LEUKOCYTE ESTERASE URINE, POC: NORMAL
NITRITE, POC UA: NORMAL
PH, POC UA: 6
PROTEIN, POC: NORMAL
SPECIFIC GRAVITY, POC UA: 1.01
UROBILINOGEN, POC UA: NORMAL

## 2018-06-07 PROCEDURE — 99499 UNLISTED E&M SERVICE: CPT | Mod: S$GLB,,, | Performed by: UROLOGY

## 2018-06-07 PROCEDURE — 99999 PR PBB SHADOW E&M-EST. PATIENT-LVL III: CPT | Mod: PBBFAC,,, | Performed by: UROLOGY

## 2018-06-07 PROCEDURE — 52000 CYSTOURETHROSCOPY: CPT | Mod: 59,S$GLB,, | Performed by: UROLOGY

## 2018-06-07 PROCEDURE — 81002 URINALYSIS NONAUTO W/O SCOPE: CPT | Mod: S$GLB,,, | Performed by: UROLOGY

## 2018-06-07 RX ORDER — TAMSULOSIN HYDROCHLORIDE 0.4 MG/1
0.4 CAPSULE ORAL DAILY
Qty: 30 CAPSULE | Refills: 11 | Status: SHIPPED | OUTPATIENT
Start: 2018-06-07 | End: 2019-06-07

## 2018-06-07 RX ORDER — FINASTERIDE 5 MG/1
5 TABLET, FILM COATED ORAL DAILY
Qty: 30 TABLET | Refills: 11 | Status: SHIPPED | OUTPATIENT
Start: 2018-06-07 | End: 2018-06-07 | Stop reason: SDUPTHER

## 2018-06-07 NOTE — PROGRESS NOTES
Chief Complaint: Hematuria    HPI:   6/7/18: Hematuria was confirmed after last visit.  -UCx. Flomax helped a lot. CT Urogram redemonstrates lung lesions consistent with history; discussed.  Cysto shows high bladder neck and BPH no bladder masses..  5/7/18: 68 yo man with reduced sometimes intermittent stream.  Has to force a stream and takes a very long time.  Has a colostomy from resection of colon cancer with lung metastases managed by Dr. Bass. No sig abd/pelvic pain and no exac/rel factors.  No hematuria.  No urolithiasis.  Had a rectal resection.    Allergies:  Patient has no known allergies.    Medications:  has a current medication list which includes the following prescription(s): ergocalciferol, hydrochlorothiazide, hydrocodone-acetaminophen, metformin, omeprazole, polyethylene glycol, ranitidine, tamsulosin, and tizanidine.    Review of Systems:  General: No fever, chills, fatigability, or weight loss.  Skin: No rashes, itching, or changes in color or texture of skin.  Chest: Denies PEREZ, cyanosis, wheezing, cough, and sputum production.  Abdomen: Appetite fine. No weight loss. Denies diarrhea, abdominal pain, hematemesis, or blood in stool.  Musculoskeletal: No joint stiffness or swelling. Denies back pain.  : As above.  All other review of systems negative.    PMH:   has a past medical history of Abnormal LFTs; Arthritis; Back pain; Chronic bronchitis; Diabetes mellitus without complication; Gastrostomy status; Hypertension; Obesity; Peripheral neuropathy (03/08/2017); Rectal cancer; and Visual disturbance (03/08/2017).    PSH:   has a past surgical history that includes Cervical fusion; Colostomy (2015); and Mediport insertion, single (2015).    FamHx: family history includes Diabetes in his mother; Heart attack in his brother and brother; Hypertension in his father, mother, and sister; Kidney disease in his father; Stroke in his father.    SocHx:  reports that he quit smoking about 3 years ago. He  has never used smokeless tobacco. He reports that he does not drink alcohol or use drugs.      Physical Exam:  There were no vitals filed for this visit.  General: A&Ox3, no apparent distress, no deformities  Neck: No masses, normal thyroid  Lungs: normal inspiration, no use of accessory muscles  Heart: normal pulse, no arrhythmias  Abdomen: Soft, NT, ND  Skin: The skin is warm and dry. No jaundice.  Ext: No c/c/e.  :     5/18: Test desc smason, no abnormalities of epididymus. Penis normal, with normal penile and scrotal skin. Meatus normal. No anus.    Labs/Studies:   PSA    3/17: 0.7    Procedure: Diagnostic Cystoscopy    Procedure in Detail: After proper consents were obtained, the patient was prepped and draped in normal sterile fashion for diagnostic cystoscopy. 5 ml of lidocaine jelly was instilled in the urethra. The flexible cystoscope was then introduced into the urethra, and advanced into the bladder under direct vision. The urethral mucosa appeared normal, and no strictures were noted. The sphincter appeared to be normal, and the veru montanum was unremarkable. The prostatic mucosa and the lateral lobes of the prostate were opposed with high bladder neck. The bladder neck was otherwise normal. Inspection of the interior of the bladder was then carried out. The trigone was unremarkable, with no mucosal lesions. The ureteral orifices were normal in position and configuration. Systematic inspection of the mucosa of the bladder it was then carried out, rotating the cystoscope so that all areas of the left and right lateral walls, the dome of the bladder, and the posterior wall were all visualized. The cystoscope was then advanced further into the bladder, and maximum deflection of the scope was performed so that the bladder neck could be inspected. No mucosal lesions were noted there. The cystoscope was then removed, and the procedure terminated.     Findings: trabeculated bladder, fine powdery bladder stones,  BPH    Impression/Plan:   1. Continue flomax, add finasteride.  US/RTC 1 year.

## 2018-06-11 ENCOUNTER — PATIENT OUTREACH (OUTPATIENT)
Dept: ADMINISTRATIVE | Facility: HOSPITAL | Age: 68
End: 2018-06-11

## 2018-06-11 RX ORDER — FINASTERIDE 5 MG/1
TABLET, FILM COATED ORAL
Qty: 90 TABLET | Refills: 11 | Status: SHIPPED | OUTPATIENT
Start: 2018-06-11

## 2018-06-25 ENCOUNTER — OFFICE VISIT (OUTPATIENT)
Dept: INTERNAL MEDICINE | Facility: CLINIC | Age: 68
End: 2018-06-25
Payer: MEDICARE

## 2018-06-25 VITALS
DIASTOLIC BLOOD PRESSURE: 80 MMHG | WEIGHT: 189.38 LBS | BODY MASS INDEX: 26.51 KG/M2 | OXYGEN SATURATION: 98 % | TEMPERATURE: 99 F | SYSTOLIC BLOOD PRESSURE: 140 MMHG | HEIGHT: 71 IN | HEART RATE: 70 BPM | RESPIRATION RATE: 18 BRPM

## 2018-06-25 DIAGNOSIS — I10 ESSENTIAL HYPERTENSION: ICD-10-CM

## 2018-06-25 DIAGNOSIS — C20 RECTAL CANCER: ICD-10-CM

## 2018-06-25 DIAGNOSIS — R63.4 WEIGHT LOSS, UNINTENTIONAL: ICD-10-CM

## 2018-06-25 DIAGNOSIS — G57.93 NEUROPATHY OF BOTH FEET: ICD-10-CM

## 2018-06-25 DIAGNOSIS — E11.9 DIABETES MELLITUS TYPE 2 IN NONOBESE: ICD-10-CM

## 2018-06-25 DIAGNOSIS — E55.9 VITAMIN D DEFICIENCY: ICD-10-CM

## 2018-06-25 DIAGNOSIS — R11.15 NON-INTRACTABLE CYCLICAL VOMITING WITH NAUSEA: ICD-10-CM

## 2018-06-25 DIAGNOSIS — Z13.6 SCREENING FOR AAA (ABDOMINAL AORTIC ANEURYSM): ICD-10-CM

## 2018-06-25 DIAGNOSIS — Z00.00 ANNUAL PHYSICAL EXAM: Primary | ICD-10-CM

## 2018-06-25 PROBLEM — R39.198 DECREASED URINE STREAM: Status: RESOLVED | Noted: 2018-03-08 | Resolved: 2018-06-25

## 2018-06-25 PROBLEM — E66.9 OBESITY (BMI 30.0-34.9): Status: RESOLVED | Noted: 2017-03-08 | Resolved: 2018-06-25

## 2018-06-25 LAB
CREAT UR-MCNC: 251 MG/DL
MICROALBUMIN UR DL<=1MG/L-MCNC: 34 UG/ML
MICROALBUMIN/CREATININE RATIO: 13.5 UG/MG

## 2018-06-25 PROCEDURE — 99214 OFFICE O/P EST MOD 30 MIN: CPT | Mod: S$GLB,,, | Performed by: INTERNAL MEDICINE

## 2018-06-25 PROCEDURE — 82043 UR ALBUMIN QUANTITATIVE: CPT

## 2018-06-25 PROCEDURE — 3079F DIAST BP 80-89 MM HG: CPT | Mod: CPTII,S$GLB,, | Performed by: INTERNAL MEDICINE

## 2018-06-25 PROCEDURE — 3077F SYST BP >= 140 MM HG: CPT | Mod: CPTII,S$GLB,, | Performed by: INTERNAL MEDICINE

## 2018-06-25 PROCEDURE — S0119 ONDANSETRON 4 MG: HCPCS | Mod: S$GLB,,, | Performed by: INTERNAL MEDICINE

## 2018-06-25 PROCEDURE — 99999 PR PBB SHADOW E&M-EST. PATIENT-LVL III: CPT | Mod: PBBFAC,,, | Performed by: INTERNAL MEDICINE

## 2018-06-25 PROCEDURE — 99499 UNLISTED E&M SERVICE: CPT | Mod: S$GLB,,, | Performed by: INTERNAL MEDICINE

## 2018-06-25 RX ORDER — ONDANSETRON 4 MG/1
4 TABLET, FILM COATED ORAL
Status: COMPLETED | OUTPATIENT
Start: 2018-06-25 | End: 2018-06-25

## 2018-06-25 RX ADMIN — ONDANSETRON 4 MG: 4 TABLET, FILM COATED ORAL at 12:06

## 2018-06-25 NOTE — PROGRESS NOTES
Subjective:      Patient ID: Solo Cid is a 68 y.o. male.    Chief Complaint: Annual Exam      HPI     Mr. Solo Cid is a patient of Jude Malagon MD, who presents for annual.    He reports having nausea and abn pain since Friday, which manifested as emesis today, which he attributes to [facility] [facility]. with the kids. He took ondansetron, which was left over from his chemo regimen, which helps. He didn't take it this morning. His colostomy bag has been putting out less than usual stool.     He reports wanting to lose weight to be able to fit in his older clothes, which he has been able to do due to a decreased appetite.      Past Medical History:   Diagnosis Date    Abnormal LFTs     Arthritis     Back pain     Chronic bronchitis     Diabetes mellitus without complication     A1c 6.3% 2017 on metformin 500 mg QD only    Gastrostomy status     Hypertension     Obesity     Peripheral neuropathy 03/08/2017    Noted by NP during preventive visit; oncologist, Dr. Siddiqui, is aware    Rectal cancer     s/p XRT/CTX (Oncologist: Dr. Gonzalez Siddiqui: [phone]/Abdominal Perineal Resection (Proctologist: Dr. Shivam Reynaga)    Visual disturbance 03/08/2017    bilateral blurry vision reported to NP on prevention visit     Past Surgical History:   Procedure Laterality Date    CERVICAL FUSION      COLOSTOMY  2015    MEDIPORT INSERTION, SINGLE  2015     Social History     Social History    Marital status: Single     Spouse name: N/A    Number of children: N/A    Years of education: N/A     Occupational History    Not on file.     Social History Main Topics    Smoking status: Former Smoker     Quit date: 1/21/2015    Smokeless tobacco: Never Used    Alcohol use No    Drug use: No    Sexual activity: Not Currently     Other Topics Concern    Not on file     Social History Narrative    No narrative on file     Family History    Problem Relation Age of Onset    Hypertension Mother     Diabetes Mother     Kidney disease Father         Dialysis    Hypertension Father     Stroke Father     Hypertension Sister     Heart attack Brother     Heart attack Brother     Cancer Neg Hx        Current Outpatient Prescriptions:     ergocalciferol (ERGOCALCIFEROL) 50,000 unit Cap, TAKE 1 CAPSULE BY MOUTH EVERY 7 DAYS, Disp: 8 capsule, Rfl: 0    finasteride (PROSCAR) 5 mg tablet, TAKE 1 TABLET(5 MG) BY MOUTH EVERY DAY, Disp: 90 tablet, Rfl: 11    hydroCHLOROthiazide (HYDRODIURIL) 12.5 MG Tab, Take 1 tablet (12.5 mg total) by mouth once daily., Disp: 90 tablet, Rfl: 3    hydrocodone-acetaminophen 10-325mg (NORCO)  mg Tab, , Disp: , Rfl: 0    metformin (GLUCOPHAGE) 500 MG tablet, Take 1 tablet (500 mg total) by mouth daily with breakfast., Disp: 30 tablet, Rfl: 11    omeprazole (PRILOSEC) 40 MG capsule, , Disp: , Rfl: 3    polyethylene glycol (GLYCOLAX) 17 gram/dose powder, Take 17 g by mouth once daily., Disp: , Rfl:     ranitidine (ZANTAC) 150 MG tablet, TAKE 1 TABLET(150 MG) BY MOUTH TWICE DAILY, Disp: 180 tablet, Rfl: 1    tamsulosin (FLOMAX) 0.4 mg Cp24, Take 1 capsule (0.4 mg total) by mouth once daily., Disp: 30 capsule, Rfl: 11    tizanidine 4 mg Cap, Take 4 mg by mouth., Disp: , Rfl:     Current Facility-Administered Medications:     ondansetron tablet 4 mg, 4 mg, Oral, 1 time in Clinic/HOD, Jude Malagon MD    Review of patient's allergies indicates:  No Known Allergies     Review of Systems   Constitutional: Negative.   Cardiovascular: Negative.   Respiratory: Negative.   Gastrointestinal: +as of HPI.   Genitourinary: Negative.   Musculoskeletal: Negative.   Derm: Negative.  Allergic/Immunologic: Negative.   Endocrine: Negative.   Hematological: Negative.   Neurological: Negative.   Psychiatric/Behavioral: Negative.       Objective:     BP (!) 140/80 (BP Location: Left arm, Patient Position: Sitting, BP Method: Large  "(Manual))   Pulse 70   Temp 98.9 °F (37.2 °C) (Tympanic)   Resp 18   Ht 5' 11" (1.803 m)   Wt 85.9 kg (189 lb 6 oz)   SpO2 98%   BMI 26.41 kg/m²     Physical Exam  GEN: A&O fully, NAD  PSYC: Normal affect  HEENT: OP: Clear, no LAD, no thyroid masses  CV: RRR, no M/G/R  PULM: CTA bilaterally, no wheezes, rales  GI: S/NT/ND, normal bowel sounds; colostomy bag WNL with small amount of brown stool  EXT: No C/C/E  NEURO: CN II-XII intact, 5/5 strength globally, no sensory losses, except numbness of both since chemo; normal tandem gait, normal Romberg, 2+ DTRs globally      Lab Results   Component Value Date    WBC 3.52 (L) 12/01/2017    HGB 13.9 (L) 12/01/2017    HCT 42.2 12/01/2017     12/01/2017    CHOL 159 03/17/2017    TRIG 138 03/17/2017    HDL 46 03/17/2017    LDLCALC 85.4 03/17/2017     (H) 12/01/2017    AST 99 (H) 12/01/2017     12/01/2017    K 4.2 12/01/2017     12/01/2017    CREATININE 0.9 06/01/2018    BUN 26 (H) 12/01/2017    CO2 30 (H) 12/01/2017    TSH 2.385 03/17/2017    PSA 0.67 03/17/2017    HGBA1C 6.3 (H) 03/17/2017       Assessment:      1. Annual physical exam    2. Non-intractable cyclical vomiting with nausea: May be 2/2 food poisoning vs. viral gastroenteritis. Risks and benefits discussed and patient chose to move forward with ondansetron 4 mg SL x1. Advised to avoid meat. Will check electrolytes and CBC today.    3. Neuropathy of both feet: Likely 2/2 chemo, which started at that time.    4. Essential hypertension: Not at goal. Will recheck in 2 weeks given current n/v.   5. Weight loss, unintentional: Will check CEA given h/o CRC.   6. Vitamin D deficiency: Will check.   7. Diabetes mellitus type 2 in nonobese: Well controlled. Will check foot exam in 2 weeks. He plans to have his eye exam scheduled soon. Will check A1c and microalb today. He will consider atorvastatin for CV prevention on next visit after consulting with his sister.        Plan:   Annual " physical exam  -     CBC auto differential; Future; Expected date: 06/25/2018  -     Comprehensive metabolic panel; Future; Expected date: 06/25/2018  -     Hemoglobin A1c; Future; Expected date: 06/25/2018  -     Lipid panel; Future; Expected date: 06/25/2018  -     Vitamin D; Future; Expected date: 06/25/2018  -     TSH; Future; Expected date: 06/25/2018    Non-intractable cyclical vomiting with nausea  -     ondansetron tablet 4 mg; Take 1 tablet (4 mg total) by mouth one time.  -     CBC auto differential; Future; Expected date: 06/25/2018  -     Comprehensive metabolic panel; Future; Expected date: 06/25/2018  -     TSH; Future; Expected date: 06/25/2018    Neuropathy of both feet    Essential hypertension    Weight loss, unintentional  -     TSH; Future; Expected date: 06/25/2018    Vitamin D deficiency  -     Vitamin D; Future; Expected date: 06/25/2018    Diabetes mellitus type 2 in nonobese  -     Hemoglobin A1c; Future; Expected date: 06/25/2018  -     Lipid panel; Future; Expected date: 06/25/2018  -     Microalbumin/creatinine urine ratio    Rectal cancer S/P colostomy  -     CEA; Future; Expected date: 06/25/2018    Screening for AAA (abdominal aortic aneurysm)  -     US Abdominal Aorta; Future; Expected date: 06/25/2018        Follow-up in about 2 weeks (around 7/9/2018), or if symptoms worsen or fail to improve, for FU on HTN, n/v, weight loss, HM.

## 2018-07-23 ENCOUNTER — OFFICE VISIT (OUTPATIENT)
Dept: INTERNAL MEDICINE | Facility: CLINIC | Age: 68
End: 2018-07-23
Payer: MEDICARE

## 2018-07-23 ENCOUNTER — LAB VISIT (OUTPATIENT)
Dept: LAB | Facility: HOSPITAL | Age: 68
End: 2018-07-23
Attending: INTERNAL MEDICINE
Payer: MEDICARE

## 2018-07-23 VITALS
HEART RATE: 89 BPM | DIASTOLIC BLOOD PRESSURE: 56 MMHG | BODY MASS INDEX: 26.17 KG/M2 | HEIGHT: 71 IN | TEMPERATURE: 99 F | SYSTOLIC BLOOD PRESSURE: 100 MMHG | WEIGHT: 186.94 LBS

## 2018-07-23 DIAGNOSIS — E55.9 VITAMIN D DEFICIENCY: ICD-10-CM

## 2018-07-23 DIAGNOSIS — C20 RECTAL CANCER: ICD-10-CM

## 2018-07-23 DIAGNOSIS — R11.15 NON-INTRACTABLE CYCLICAL VOMITING WITH NAUSEA: ICD-10-CM

## 2018-07-23 DIAGNOSIS — G57.93 NEUROPATHY OF BOTH FEET: ICD-10-CM

## 2018-07-23 DIAGNOSIS — M62.838 MUSCLE SPASM: Primary | ICD-10-CM

## 2018-07-23 DIAGNOSIS — E11.9 DIABETES MELLITUS WITHOUT COMPLICATION: ICD-10-CM

## 2018-07-23 DIAGNOSIS — I10 ESSENTIAL HYPERTENSION: ICD-10-CM

## 2018-07-23 DIAGNOSIS — Z00.00 ANNUAL PHYSICAL EXAM: ICD-10-CM

## 2018-07-23 DIAGNOSIS — R63.4 WEIGHT LOSS, UNINTENTIONAL: ICD-10-CM

## 2018-07-23 DIAGNOSIS — E11.9 DIABETES MELLITUS TYPE 2 IN NONOBESE: ICD-10-CM

## 2018-07-23 LAB
25(OH)D3+25(OH)D2 SERPL-MCNC: 35 NG/ML
ALBUMIN SERPL BCP-MCNC: 3.4 G/DL
ALP SERPL-CCNC: 96 U/L
ALT SERPL W/O P-5'-P-CCNC: 81 U/L
ANION GAP SERPL CALC-SCNC: 8 MMOL/L
AST SERPL-CCNC: 111 U/L
BASOPHILS # BLD AUTO: 0.03 K/UL
BASOPHILS NFR BLD: 0.7 %
BILIRUB SERPL-MCNC: 0.9 MG/DL
BUN SERPL-MCNC: 14 MG/DL
CALCIUM SERPL-MCNC: 9.6 MG/DL
CEA SERPL-MCNC: 24.5 NG/ML
CHLORIDE SERPL-SCNC: 100 MMOL/L
CHOLEST SERPL-MCNC: 137 MG/DL
CHOLEST/HDLC SERPL: 3 {RATIO}
CO2 SERPL-SCNC: 27 MMOL/L
CREAT SERPL-MCNC: 0.8 MG/DL
DIFFERENTIAL METHOD: ABNORMAL
EOSINOPHIL # BLD AUTO: 0.1 K/UL
EOSINOPHIL NFR BLD: 2.6 %
ERYTHROCYTE [DISTWIDTH] IN BLOOD BY AUTOMATED COUNT: 17.6 %
EST. GFR  (AFRICAN AMERICAN): >60 ML/MIN/1.73 M^2
EST. GFR  (NON AFRICAN AMERICAN): >60 ML/MIN/1.73 M^2
GLUCOSE SERPL-MCNC: 91 MG/DL
HCT VFR BLD AUTO: 34.7 %
HDLC SERPL-MCNC: 46 MG/DL
HDLC SERPL: 33.6 %
HGB BLD-MCNC: 11.2 G/DL
IMM GRANULOCYTES # BLD AUTO: 0.01 K/UL
IMM GRANULOCYTES NFR BLD AUTO: 0.2 %
LDLC SERPL CALC-MCNC: 74.8 MG/DL
LYMPHOCYTES # BLD AUTO: 1.2 K/UL
LYMPHOCYTES NFR BLD: 27.7 %
MCH RBC QN AUTO: 32 PG
MCHC RBC AUTO-ENTMCNC: 32.3 G/DL
MCV RBC AUTO: 99 FL
MONOCYTES # BLD AUTO: 0.5 K/UL
MONOCYTES NFR BLD: 10.8 %
NEUTROPHILS # BLD AUTO: 2.5 K/UL
NEUTROPHILS NFR BLD: 58 %
NONHDLC SERPL-MCNC: 91 MG/DL
NRBC BLD-RTO: 1 /100 WBC
PLATELET # BLD AUTO: 252 K/UL
PMV BLD AUTO: 10.2 FL
POTASSIUM SERPL-SCNC: 3.9 MMOL/L
PROT SERPL-MCNC: 8 G/DL
RBC # BLD AUTO: 3.5 M/UL
SODIUM SERPL-SCNC: 135 MMOL/L
TRIGL SERPL-MCNC: 81 MG/DL
TSH SERPL DL<=0.005 MIU/L-ACNC: 1.3 UIU/ML
WBC # BLD AUTO: 4.26 K/UL

## 2018-07-23 PROCEDURE — 99499 UNLISTED E&M SERVICE: CPT | Mod: HCNC,S$GLB,, | Performed by: INTERNAL MEDICINE

## 2018-07-23 PROCEDURE — 85025 COMPLETE CBC W/AUTO DIFF WBC: CPT

## 2018-07-23 PROCEDURE — 80061 LIPID PANEL: CPT

## 2018-07-23 PROCEDURE — 82378 CARCINOEMBRYONIC ANTIGEN: CPT

## 2018-07-23 PROCEDURE — 80053 COMPREHEN METABOLIC PANEL: CPT

## 2018-07-23 PROCEDURE — 36415 COLL VENOUS BLD VENIPUNCTURE: CPT | Mod: PO

## 2018-07-23 PROCEDURE — 83036 HEMOGLOBIN GLYCOSYLATED A1C: CPT

## 2018-07-23 PROCEDURE — 99214 OFFICE O/P EST MOD 30 MIN: CPT | Mod: S$GLB,,, | Performed by: INTERNAL MEDICINE

## 2018-07-23 PROCEDURE — 84443 ASSAY THYROID STIM HORMONE: CPT

## 2018-07-23 PROCEDURE — 99999 PR PBB SHADOW E&M-EST. PATIENT-LVL III: CPT | Mod: PBBFAC,,, | Performed by: INTERNAL MEDICINE

## 2018-07-23 PROCEDURE — 82306 VITAMIN D 25 HYDROXY: CPT

## 2018-07-23 RX ORDER — TIZANIDINE HYDROCHLORIDE 4 MG/1
4 CAPSULE, GELATIN COATED ORAL 3 TIMES DAILY PRN
Qty: 30 CAPSULE | Refills: 11 | Status: SHIPPED | OUTPATIENT
Start: 2018-07-23 | End: 2018-08-27

## 2018-07-23 NOTE — PROGRESS NOTES
Subjective:      Patient ID: Solo Cid is a 68 y.o. male.    Chief Complaint: Hospital Follow Up      HPI     Mr. Solo Cid is a patient of Jude Malagon MD, who presents for hospital follow-up at [facility] for dehydration, weakness, intractable emesis, on 6/25/18, which was tx'd with NGT, and manual disimpaction of his colostomy. He stayed at [facility] for 5 days, at which time labs & imaging was obtained. He was on a liquid diet for 2 days, which was advanced gradually to semi-solids, and eventually to solids, including baked fish and potatoes. Last emesis was 6/25/18. He brings his DC packet with him.     Labs & imaging reviewed and discussed with patient dated 6/30/18: BMP WNL, except glu 113, CBC revealed WBC 3.8 1000/uL, RBC 3.53 mill/uL,  (WNL); CMP from 6/27/18 revealed glu 136, alb 3.4, TB 1.3, AST 74, ALT 57, o/w WNL; lactic acid, lipase, u/a all WNL;   CT abn, pelvis w/ PO & IV contrast:    1. Diffuse fluid distention of large and small bowel, narrowing at the ostomy site suspicious for at least relative obstruction.  2. Progression of pulmonary metastases in the lung bases.    CXR: Increased opacification right lung base. Scattered bilateral pulmonary nodules, better demonstrated on recent chest CT. No pneumothorax. Right chest port in place. NG tube in the stomach.     He also reports his usual neck pain, which he reports is improved with Norco (prescribed by oncology) and muscle relaxer, which he needs a refill for.         Of note, EPIC indicates she is due for PPSV23 #2, foot exam, FLP, A1c, AAA u/s & low dose statin.      Past Medical History:   Diagnosis Date    Abnormal LFTs     Arthritis     Back pain     Chronic bronchitis     Diabetes mellitus without complication     A1c 6.3% 2017 on metformin 500 mg QD only    Gastrostomy status     Hepatitis C antibody positive in blood     Hypertension     Neoplastic (malignant) related fatigue     Obesity     Pancytopenia 12/12/2017     Present currently 2/2 chemo    Peripheral neuropathy 03/08/2017    2/2 chemo; Noted by NP during preventive visit; oncologist, Dr. Siddiqui, is aware    Rectal cancer metastasized to lung 04/30/2014    Stage IIIB, [id number]; s/p s;p Abdominoperineal resec 5/29/15, path: 0.75 cm tumor, neg margins, grade 2, 0 of 14 LN pos, path T3N0. XRT/CTX (Oncologist: Dr. Gonzalez Siddiqui: [phone]/Abdominal Perineal Resec(Proctologist: Dr. Shivam Reynaga); s/p 5-FU, rad 02/19/15-3/30/15 Dr. Shrestha. FOLFOX 8/10/15-1/25/16; 5/25/18 CT Chest, abn/pel: mult pulm nodules SUSAN needle bx +met adenocarcinoma    Visual disturbance 03/08/2017    bilateral blurry vision reported to NP on prevention visit    Weight loss, non-intentional     Appetite decreased but now improving     Past Surgical History:   Procedure Laterality Date    CERVICAL FUSION      COLOSTOMY  2015    MEDIPORT INSERTION, SINGLE  2015     Social History     Social History    Marital status: Single     Spouse name: N/A    Number of children: N/A    Years of education: N/A     Occupational History    Not on file.     Social History Main Topics    Smoking status: Former Smoker     Quit date: 1/21/2015    Smokeless tobacco: Never Used    Alcohol use No    Drug use: No    Sexual activity: Not Currently     Other Topics Concern    Not on file     Social History Narrative    No narrative on file     Family History   Problem Relation Age of Onset    Hypertension Mother     Diabetes Mother     Kidney disease Father         Dialysis    Hypertension Father     Stroke Father     Hypertension Sister     Heart attack Brother     Heart attack Brother     Cancer Neg Hx        Current Outpatient Prescriptions:     ergocalciferol (ERGOCALCIFEROL) 50,000 unit Cap, TAKE 1 CAPSULE BY MOUTH EVERY 7 DAYS, Disp: 8 capsule, Rfl: 0    finasteride (PROSCAR) 5 mg tablet, TAKE 1 TABLET(5 MG) BY MOUTH EVERY DAY, Disp: 90 tablet, Rfl: 11    hydrocodone-acetaminophen  "10-325mg (NORCO)  mg Tab, , Disp: , Rfl: 0    metformin (GLUCOPHAGE) 500 MG tablet, Take 1 tablet (500 mg total) by mouth daily with breakfast., Disp: 30 tablet, Rfl: 11    omeprazole (PRILOSEC) 40 MG capsule, , Disp: , Rfl: 3    polyethylene glycol (GLYCOLAX) 17 gram/dose powder, Take 17 g by mouth once daily., Disp: , Rfl:     ranitidine (ZANTAC) 150 MG tablet, TAKE 1 TABLET(150 MG) BY MOUTH TWICE DAILY, Disp: 180 tablet, Rfl: 1    tamsulosin (FLOMAX) 0.4 mg Cp24, Take 1 capsule (0.4 mg total) by mouth once daily., Disp: 30 capsule, Rfl: 11    tiZANidine 4 mg Cap, Take 4 mg by mouth 3 (three) times daily as needed (muscle spasm)., Disp: 30 capsule, Rfl: 11    Review of patient's allergies indicates:  No Known Allergies     Review of Systems   All remaining systems negative    Objective:     BP (!) 100/56 (BP Location: Left arm, Patient Position: Sitting, BP Method: Large (Manual))   Pulse 89   Temp 98.9 °F (37.2 °C) (Tympanic)   Ht 5' 11" (1.803 m)   Wt 84.8 kg (186 lb 15.2 oz)   BMI 26.07 kg/m²     Physical Exam  GEN: A&O fully, NAD  PSYC: Normal affect  FEET: Visual inspection WNL; 6/8 sensation on right and 5/8 on left intact to 10 mm monofilament; Feet warm to touch bilaterally; 2+ pulses bilaterally       Lab Results   Component Value Date    WBC 3.52 (L) 2017    HGB 13.9 (L) 2017    HCT 42.2 2017     2017    CHOL 159 2017    TRIG 138 2017    HDL 46 2017    LDLCALC 85.4 2017     (H) 2017    AST 99 (H) 2017     2017    K 4.2 2017     2017    CREATININE 0.9 2018    BUN 26 (H) 2017    CO2 30 (H) 2017    CALCIUM 9.5 2017    PSA 0.67 2017    HGBA1C 6.3 (H) 2017       IMAGIN/25/18:  CT CHEST W CONTRAST     CLINICAL INDICATION: Rectal cancer with lung metastases, restaging. C20     COMPARISON: 2017     TECHNIQUE: A CT scan of the chest was performed " after IV contrast administration. Automated exposure control was used for dose reduction.     FINDINGS: There is no mediastinal, hilar, or axillary adenopathy. There is no pleural effusion or pneumothorax. There has been interval enlargement of several metastatic pulmonary nodules. The largest is in the medial aspect of the right lower lobe which now measures 5.2 cm AP x 3.9 cm transverse, increased in size from 2.7 cm AP x 2.4 cm transverse. Other nodules have enlarged to a lesser degree. 2 nodules in the left lower lobe are stable. One of the nodules in the medial basal segment of the left lower lobe has slightly diminished in size.     There is no pleural effusion or pneumothorax. There are no suspicious bone lesions.     Assessment:      1. Muscle spasm: Uncontrolled. Risks and benefits discussed and patient chose to move forward with tizanidine 4 mg TID prn neck pains.    2.      GI obstruction/emesis: Resolved.   3.      Metastatic rectal cancer: Advancing nodules in lung. Currently on chemo. Continue FU with onc.  4.      HTN: BP on lower end of normal despite lowest dose of HCTZ. Likely lower as his weight continues to            decrease 2/2 chemo/progression of met rectal ca. Will stop HCTZ today and f/u in 4 weeks.  5.      Vitamin D deficiency: Will check today.   6.      DM2: A1c well controlled 3/2017. May be able to wean off metformin given his weight loss. Will            check A1c today.    Plan:   Muscle spasm  -     tiZANidine 4 mg Cap; Take 4 mg by mouth 3 (three) times daily as needed (muscle spasm).  Dispense: 30 capsule; Refill: 11    Neuropathy of both feet    Essential hypertension    Diabetes mellitus without complication    Vitamin D deficiency        Follow-up in about 4 weeks (around 8/20/2018), or if symptoms worsen or fail to improve, for FU on neck pain, pancytopenia, HTN, DM2.

## 2018-07-24 LAB
ESTIMATED AVG GLUCOSE: 91 MG/DL
HBA1C MFR BLD HPLC: 4.8 %

## 2018-07-27 ENCOUNTER — TELEPHONE (OUTPATIENT)
Dept: RADIOLOGY | Facility: HOSPITAL | Age: 68
End: 2018-07-27

## 2018-07-30 ENCOUNTER — HOSPITAL ENCOUNTER (OUTPATIENT)
Dept: RADIOLOGY | Facility: HOSPITAL | Age: 68
Discharge: HOME | End: 2018-07-30
Attending: INTERNAL MEDICINE
Payer: MEDICARE

## 2018-07-30 ENCOUNTER — OFFICE VISIT (OUTPATIENT)
Dept: OPHTHALMOLOGY | Facility: CLINIC | Age: 68
End: 2018-07-30
Payer: MEDICARE

## 2018-07-30 DIAGNOSIS — C78.00 MALIGNANT NEOPLASM METASTATIC TO LUNG, UNSPECIFIED LATERALITY: ICD-10-CM

## 2018-07-30 DIAGNOSIS — Z13.6 SCREENING FOR AAA (ABDOMINAL AORTIC ANEURYSM): ICD-10-CM

## 2018-07-30 DIAGNOSIS — E11.9 DIABETES MELLITUS WITHOUT COMPLICATION: Primary | ICD-10-CM

## 2018-07-30 DIAGNOSIS — Z85.048 HISTORY OF RECTAL CANCER: ICD-10-CM

## 2018-07-30 PROBLEM — D64.81 ANTINEOPLASTIC CHEMOTHERAPY INDUCED ANEMIA: Status: ACTIVE | Noted: 2017-07-25

## 2018-07-30 PROBLEM — T45.1X5A ANTINEOPLASTIC CHEMOTHERAPY INDUCED ANEMIA: Status: ACTIVE | Noted: 2017-07-25

## 2018-07-30 PROCEDURE — 76775 US EXAM ABDO BACK WALL LIM: CPT | Mod: TC,PO

## 2018-07-30 PROCEDURE — 92014 COMPRE OPH EXAM EST PT 1/>: CPT | Mod: S$GLB,,, | Performed by: OPHTHALMOLOGY

## 2018-07-30 PROCEDURE — 76775 US EXAM ABDO BACK WALL LIM: CPT | Mod: 26,,, | Performed by: RADIOLOGY

## 2018-07-30 PROCEDURE — 99999 PR PBB SHADOW E&M-EST. PATIENT-LVL II: CPT | Mod: PBBFAC,,, | Performed by: OPHTHALMOLOGY

## 2018-07-30 PROCEDURE — 99499 UNLISTED E&M SERVICE: CPT | Mod: HCNC,S$GLB,, | Performed by: OPHTHALMOLOGY

## 2018-07-30 RX ORDER — HYDROCODONE BITARTRATE AND ACETAMINOPHEN 10; 325 MG/1; MG/1
TABLET ORAL
COMMUNITY
Start: 2018-07-25

## 2018-07-30 NOTE — PROGRESS NOTES
===============================  07/30/2018   Solo Cid,   68 y.o. male   Last visit [facility]: :Visit date not found   Last visit eye dept. Visit date not found  [facility]:  Uncorrected distance visual acuity was 20/25 in the right eye and 20/20 in the left eye.  Tonometry     Tonometry (Applanation, 10:55 AM)       Right Left    Pressure 12 11               Not recorded        Manifest Refraction     Manifest Refraction       Sphere Cylinder Axis Dist [facility]    Right -0.25 +0.50 010 20/20    Left -0.25 +0.25 180 20/20              Chief Complaint   Patient presents with    Diabetes     yearly diabetic exam        HPI     Diabetes    Additional comments: yearly diabetic exam           Comments   Rectal ca metastatic to lung  DM, NP to Dr. Ambrose, previously seen by Dr. Lau         Last edited by KYARA Ambrose MD on 7/30/2018 10:42 AM. (History)          ________________  7/30/2018  Problem List Items Addressed This Visit        Eye/Vision problems    Diabetes mellitus without complication - Primary       Other    Malignant neoplasm metastatic to lung  No sing of metastatic disease     History of rectal cancer        Oct ok  dm no dr  Min ns    iop ok '   rtc 1y ear    .       ===========================

## 2018-08-13 ENCOUNTER — PATIENT OUTREACH (OUTPATIENT)
Dept: ADMINISTRATIVE | Facility: HOSPITAL | Age: 68
End: 2018-08-13

## 2018-08-24 ENCOUNTER — TELEPHONE (OUTPATIENT)
Dept: INTERNAL MEDICINE | Facility: CLINIC | Age: 68
End: 2018-08-24

## 2018-08-24 NOTE — TELEPHONE ENCOUNTER
Returned ashanti's phone call in regards to pt at his ext no answer LM to return call for pt's supplies./db**

## 2018-08-24 NOTE — TELEPHONE ENCOUNTER
----- Message from Dai Mejias sent at 8/24/2018 10:52 AM CDT -----  Contact: Ramirez/[facility] [phone] ext [phone]  States that she is calling to check status on order request for ostomy supplies. Please call back at [phone]-ext [phone]//thank you acc

## 2018-08-27 ENCOUNTER — OFFICE VISIT (OUTPATIENT)
Dept: INTERNAL MEDICINE | Facility: CLINIC | Age: 68
End: 2018-08-27
Payer: MEDICARE

## 2018-08-27 VITALS
HEIGHT: 71 IN | RESPIRATION RATE: 18 BRPM | SYSTOLIC BLOOD PRESSURE: 102 MMHG | TEMPERATURE: 96 F | WEIGHT: 182.13 LBS | DIASTOLIC BLOOD PRESSURE: 62 MMHG | BODY MASS INDEX: 25.5 KG/M2 | OXYGEN SATURATION: 96 % | HEART RATE: 82 BPM

## 2018-08-27 DIAGNOSIS — R56.9 SEIZURE-LIKE ACTIVITY: Primary | ICD-10-CM

## 2018-08-27 DIAGNOSIS — E85.9 AMYLOIDOSIS, UNSPECIFIED TYPE: ICD-10-CM

## 2018-08-27 DIAGNOSIS — C78.00 MALIGNANT NEOPLASM METASTATIC TO LUNG, UNSPECIFIED LATERALITY: ICD-10-CM

## 2018-08-27 DIAGNOSIS — R29.818 FOCAL NEUROLOGICAL DEFICIT: ICD-10-CM

## 2018-08-27 DIAGNOSIS — R05.9 COUGH: ICD-10-CM

## 2018-08-27 DIAGNOSIS — C20 RECTAL CANCER: ICD-10-CM

## 2018-08-27 DIAGNOSIS — M62.838 NECK MUSCLE SPASM: ICD-10-CM

## 2018-08-27 PROCEDURE — 99499 UNLISTED E&M SERVICE: CPT | Mod: S$GLB,,, | Performed by: INTERNAL MEDICINE

## 2018-08-27 PROCEDURE — 99999 PR PBB SHADOW E&M-EST. PATIENT-LVL III: CPT | Mod: PBBFAC,,, | Performed by: INTERNAL MEDICINE

## 2018-08-27 PROCEDURE — 3074F SYST BP LT 130 MM HG: CPT | Mod: CPTII,S$GLB,, | Performed by: INTERNAL MEDICINE

## 2018-08-27 PROCEDURE — 3078F DIAST BP <80 MM HG: CPT | Mod: CPTII,S$GLB,, | Performed by: INTERNAL MEDICINE

## 2018-08-27 PROCEDURE — 99214 OFFICE O/P EST MOD 30 MIN: CPT | Mod: S$GLB,,, | Performed by: INTERNAL MEDICINE

## 2018-08-27 RX ORDER — HYDROCHLOROTHIAZIDE 12.5 MG/1
TABLET ORAL
Refills: 2 | COMMUNITY
Start: 2018-08-05 | End: 2018-08-27 | Stop reason: ALTCHOICE

## 2018-08-27 RX ORDER — CODEINE PHOSPHATE AND GUAIFENESIN 10; 100 MG/5ML; MG/5ML
5 SOLUTION ORAL 3 TIMES DAILY PRN
Qty: 118 ML | Refills: 0 | Status: SHIPPED | OUTPATIENT
Start: 2018-08-27 | End: 2018-09-06

## 2018-08-27 RX ORDER — POLYETHYLENE GLYCOL 3350 17 G/17G
17 POWDER, FOR SOLUTION ORAL DAILY
Qty: 119 G | Refills: 11 | Status: SHIPPED | OUTPATIENT
Start: 2018-08-27

## 2018-08-27 RX ORDER — TIZANIDINE 4 MG/1
TABLET ORAL
Qty: 385 TABLET | Refills: 1 | Status: SHIPPED | OUTPATIENT
Start: 2018-08-27

## 2018-08-27 RX ORDER — TIZANIDINE 4 MG/1
4 TABLET ORAL EVERY 6 HOURS PRN
Qty: 30 TABLET | Refills: 1 | Status: SHIPPED | OUTPATIENT
Start: 2018-08-27 | End: 2018-08-27 | Stop reason: SDUPTHER

## 2018-08-27 RX ORDER — TRIFLURIDINE AND TIPIRACIL 20; 8.19 MG/1; MG/1
TABLET, FILM COATED ORAL
COMMUNITY
Start: 2018-08-06

## 2018-08-27 NOTE — PROGRESS NOTES
Subjective:      Patient ID: Solo Cid is a 68 y.o. male.    Chief Complaint: Follow-up (1 month./db**) and Spasms      HPI     Mr. Solo Cid is a patient of Jude Malagon MD, who presents for intermittent neck muscle spasms over the past several months for which he takes tizanidine, which helps, but causes sedation. He reports not being able to afford the capsules, which were $100.    He reports having a 2 week h/o cough productive of yellow-white mucous associated with sore throat. No f/c. He tried OTC Recola. +HAs. He endorses sick contact with his grandson.     He reports last Thursday having gone to sleep in the living room on the sofa (his usual place to sleep) around 10:30 pm and woke up at 4 am in the kitchen on the ground on his back with urinary incontinence. He didn't have any soreness or confusion except for not know how he made it to the kitchen. He has never experienced sleep walking in the past. He notes having two red spots on the back side of his tongue that morning, which was new.     Of note, EPIC indicates she is due for flu shot next month.      Past Medical History:   Diagnosis Date    Abnormal LFTs     Antineoplastic chemotherapy induced anemia 7/25/2017    Arthritis     Back pain     Chronic bronchitis     Diabetes mellitus without complication     A1c 6.3% 2017 on metformin 500 mg QD only    Gastrostomy status     Hepatitis C antibody positive in blood     Hypertension     Neoplastic (malignant) related fatigue     Obesity     Pancytopenia 12/12/2017    Present currently 2/2 chemo    Peripheral neuropathy 03/08/2017    2/2 chemo; Noted by NP during preventive visit; oncologist, Dr. Siddiqui, is aware    Rectal cancer metastasized to lung 04/30/2014    Stage IIIB, [id number]; s/p s;p Abdominoperineal resec 5/29/15, path: 0.75 cm tumor, neg margins, grade 2, 0 of 14 LN pos, path T3N0. XRT/CTX (Oncologist: Dr. Gonzalez Siddiqui: [phone]/Abdominal Perineal  Resec(Proctologist: Dr. Shivam Reynaga); s/p 5-FU, rad 02/19/15-3/30/15 Dr. Shrestha. FOLFOX 8/10/15-1/25/16; 5/25/18 CT Chest, abn/pel: mult pulm nodules SUSAN needle bx +met adenocarcinoma    Visual disturbance 03/08/2017    bilateral blurry vision reported to NP on prevention visit    Weight loss, non-intentional     Appetite decreased but now improving     Past Surgical History:   Procedure Laterality Date    CERVICAL FUSION      COLOSTOMY  2015    MEDIPORT INSERTION, SINGLE  2015     Social History     Socioeconomic History    Marital status: Single     Spouse name: Not on file    Number of children: Not on file    Years of education: Not on file    Highest education level: Not on file   Social Needs    Financial resource strain: Not on file    Food insecurity - worry: Not on file    Food insecurity - inability: Not on file    Transportation needs - medical: Not on file    Transportation needs - non-medical: Not on file   Occupational History    Not on file   Tobacco Use    Smoking status: Former Smoker     Last attempt to quit: 1/21/2015     Years since quitting: 3.6    Smokeless tobacco: Never Used   Substance and Sexual Activity    Alcohol use: No    Drug use: No    Sexual activity: Not Currently   Other Topics Concern    Not on file   Social History Narrative    Not on file     Family History   Problem Relation Age of Onset    Hypertension Mother     Diabetes Mother     Kidney disease Father         Dialysis    Hypertension Father     Stroke Father     Hypertension Sister     Heart attack Brother     Heart attack Brother     Cancer Neg Hx        Current Outpatient Medications:     ergocalciferol (ERGOCALCIFEROL) 50,000 unit Cap, TAKE 1 CAPSULE BY MOUTH EVERY 7 DAYS, Disp: 8 capsule, Rfl: 0    finasteride (PROSCAR) 5 mg tablet, TAKE 1 TABLET(5 MG) BY MOUTH EVERY DAY, Disp: 90 tablet, Rfl: 11    HYDROcodone-acetaminophen (NORCO)  mg per tablet, Take by mouth., Disp: ,  "Rfl:     LONSURF 20-8.19 mg Tab, , Disp: , Rfl:     omeprazole (PRILOSEC) 40 MG capsule, , Disp: , Rfl: 3    polyethylene glycol (GLYCOLAX) 17 gram/dose powder, Take 17 g by mouth once daily., Disp: 119 g, Rfl: 11    ranitidine (ZANTAC) 150 MG tablet, TAKE 1 TABLET(150 MG) BY MOUTH TWICE DAILY, Disp: 180 tablet, Rfl: 1    tamsulosin (FLOMAX) 0.4 mg Cp24, Take 1 capsule (0.4 mg total) by mouth once daily., Disp: 30 capsule, Rfl: 11    guaifenesin-codeine 100-10 mg/5 ml (TUSSI-ORGANIDIN NR)  mg/5 mL syrup, Take 5 mLs by mouth 3 (three) times daily as needed., Disp: 118 mL, Rfl: 0    tiZANidine (ZANAFLEX) 4 MG tablet, Take 1 tablet (4 mg total) by mouth every 6 (six) hours as needed., Disp: 30 tablet, Rfl: 1  No current facility-administered medications for this visit.     Review of patient's allergies indicates:  No Known Allergies     Review of Systems   All remaining systems negative    Objective:     /62 (BP Location: Left arm, Patient Position: Sitting, BP Method: Medium (Manual))   Pulse 82   Temp 96 °F (35.6 °C) (Tympanic)   Resp 18   Ht 5' 11" (1.803 m)   Wt 82.6 kg (182 lb 1.6 oz)   SpO2 96%   BMI 25.40 kg/m²     Physical Exam  GEN: A&O fully, NAD  PSYC: Normal affect  NEURO: CN II-XII intact, 5/5 strength globally, no sensory losses, normal tandem gait, +Romberg      Lab Results   Component Value Date    WBC 4.26 07/23/2018    HGB 11.2 (L) 07/23/2018    HCT 34.7 (L) 07/23/2018     07/23/2018    CHOL 137 07/23/2018    TRIG 81 07/23/2018    HDL 46 07/23/2018    LDLCALC 74.8 07/23/2018    ALT 81 (H) 07/23/2018     (H) 07/23/2018     (L) 07/23/2018    K 3.9 07/23/2018     07/23/2018    CREATININE 0.8 07/23/2018    BUN 14 07/23/2018    CO2 27 07/23/2018    CALCIUM 9.6 07/23/2018    PSA 0.67 03/17/2017    HGBA1C 4.8 07/23/2018       Assessment:      1. Seizure-like activity: DDx includes minor concussion. Given abnormal neuro exam and h/o met CRC, will check EEG " and brain MRI to r/o epilepsy or metastatic dz to brain.    2. Neck muscle spasm: Risks and benefits discussed and patient chose to move forward with renewing his tizanidine 4 mg tablets 1 po qhs prn.    3. Cough: Risks and benefits discussed and patient chose to move forward with Robitussin AC po qhs PRN. Regarding your upper respiratory infection, I recommend the following lifestyle modification:      Ginger/lemon/honey Juice          Ingredients (preferably organic & local):    3-5 Ginger roots   3 yolanda and honey      Preparation:    Ginger root   - Wash   - Chop   - Add to  with an equal proportion of water   - Blend   - Strain   - Pour to fill ¾ of any size container (i.e. glass bottle)    Yolanda   - Squeeze yolanda    - Pour juice to fill ¼ of glass bottle    Add honey to glass bottle to taste      Storage & Application    Refrigerate   Enjoy   - Shake & sip as needed for cough, congestion, sore throat (also good for BPH)   - Avoid drinking >3 oz in one sitting, which can lead to gastrointestinal irritation     4. Amyloidosis, unspecified type        Plan:   Seizure-like activity  -     EEG; Future    Neck muscle spasm  -     tiZANidine (ZANAFLEX) 4 MG tablet; Take 1 tablet (4 mg total) by mouth every 6 (six) hours as needed.  Dispense: 30 tablet; Refill: 1    Cough  -     guaifenesin-codeine 100-10 mg/5 ml (TUSSI-ORGANIDIN NR)  mg/5 mL syrup; Take 5 mLs by mouth 3 (three) times daily as needed.  Dispense: 118 mL; Refill: 0    Amyloidosis, unspecified type    Focal neurological deficit  -     MRI Brain W WO Contrast; Future; Expected date: 08/27/2018    Rectal cancer S/P colostomy  -     polyethylene glycol (GLYCOLAX) 17 gram/dose powder; Take 17 g by mouth once daily.  Dispense: 119 g; Refill: 11    Malignant neoplasm metastatic to lung, unspecified laterality  -     EEG; Future  -     MRI Brain W WO Contrast; Future; Expected date: 08/27/2018        Follow-up in about 4 weeks (around  9/24/2018), or if symptoms worsen or fail to improve, for FU on seizure-like activity.    I spent 25 minutes of time with patient 50% or more of which was discussing labs and plans of care.

## 2018-08-28 NOTE — TELEPHONE ENCOUNTER
Returned nataliia's phone call informed her that we received the paperwork/orders through fax just waiting on provider to fill out and once that's done it will be faxed back. Voiced understanding./db**

## 2018-08-28 NOTE — TELEPHONE ENCOUNTER
----- Message from Liz Ozuna sent at 8/28/2018 11:09 AM CDT -----  Contact: Anju/[facility] [employer]  Anju would like a call back at [phone] ext [phone], Regards to orders for ostomy supplies.    Thanks  Td

## 2018-08-29 ENCOUNTER — TELEPHONE (OUTPATIENT)
Dept: INTERNAL MEDICINE | Facility: CLINIC | Age: 68
End: 2018-08-29

## 2018-08-29 ENCOUNTER — TELEPHONE (OUTPATIENT)
Dept: RADIOLOGY | Facility: HOSPITAL | Age: 68
End: 2018-08-29

## 2018-08-29 DIAGNOSIS — R79.89 AZOTEMIA: ICD-10-CM

## 2018-08-29 DIAGNOSIS — Z13.89 SCREENING FOR NEPHROPATHY: Primary | ICD-10-CM

## 2018-08-29 NOTE — TELEPHONE ENCOUNTER
Please see message below from radiology: Please place order for pt to get a creatinine prior to radiology apt for MRI. Please advise./db**

## 2018-08-29 NOTE — TELEPHONE ENCOUNTER
----- Message from Lin Gregg sent at 8/29/2018  8:41 AM CDT -----  Good Morning!    This patient is scheduled to have a radiology exam. We will need a recent creatinine for this patient (within the last 30 days). Please place STAT order and schedule patient 1 hour prior to radiology appointment. If you have any questions please contact Radiology at [phone].    Thank You,    Lin TAI  Radiology Dept

## 2018-08-30 ENCOUNTER — HOSPITAL ENCOUNTER (OUTPATIENT)
Dept: RADIOLOGY | Facility: HOSPITAL | Age: 68
Discharge: HOME | End: 2018-08-30
Attending: INTERNAL MEDICINE
Payer: MEDICARE

## 2018-08-30 DIAGNOSIS — C78.00 MALIGNANT NEOPLASM METASTATIC TO LUNG, UNSPECIFIED LATERALITY: ICD-10-CM

## 2018-08-30 DIAGNOSIS — R29.818 FOCAL NEUROLOGICAL DEFICIT: ICD-10-CM

## 2018-08-30 PROCEDURE — 70553 MRI BRAIN STEM W/O & W/DYE: CPT | Mod: 26,,, | Performed by: RADIOLOGY

## 2018-08-30 PROCEDURE — 25500020 PHARM REV CODE 255: Mod: PO | Performed by: INTERNAL MEDICINE

## 2018-08-30 PROCEDURE — 70553 MRI BRAIN STEM W/O & W/DYE: CPT | Mod: TC,PO

## 2018-08-30 PROCEDURE — A9585 GADOBUTROL INJECTION: HCPCS | Mod: PO | Performed by: INTERNAL MEDICINE

## 2018-08-30 RX ORDER — GADOBUTROL 604.72 MG/ML
8 INJECTION INTRAVENOUS
Status: COMPLETED | OUTPATIENT
Start: 2018-08-30 | End: 2018-08-30

## 2018-08-30 RX ADMIN — GADOBUTROL 8 ML: 604.72 INJECTION INTRAVENOUS at 02:08

## 2018-09-25 ENCOUNTER — OFFICE VISIT (OUTPATIENT)
Dept: NEUROLOGY | Facility: CLINIC | Age: 68
End: 2018-09-25
Payer: MEDICARE

## 2018-09-25 VITALS
HEIGHT: 71 IN | DIASTOLIC BLOOD PRESSURE: 62 MMHG | WEIGHT: 179.69 LBS | HEART RATE: 84 BPM | BODY MASS INDEX: 25.16 KG/M2 | SYSTOLIC BLOOD PRESSURE: 126 MMHG

## 2018-09-25 DIAGNOSIS — R55 EPISODE OF LOSS OF CONSCIOUSNESS: Primary | ICD-10-CM

## 2018-09-25 DIAGNOSIS — G62.9 POLYNEUROPATHY: ICD-10-CM

## 2018-09-25 DIAGNOSIS — R56.9 SEIZURE-LIKE ACTIVITY: ICD-10-CM

## 2018-09-25 DIAGNOSIS — Z91.89 DRIVING SAFETY ISSUE: ICD-10-CM

## 2018-09-25 DIAGNOSIS — R55 CONVULSIVE SYNCOPE: ICD-10-CM

## 2018-09-25 DIAGNOSIS — C20 RECTAL CANCER: ICD-10-CM

## 2018-09-25 PROBLEM — D61.818 PANCYTOPENIA: Status: ACTIVE | Noted: 2017-12-12

## 2018-09-25 PROBLEM — G57.93 NEUROPATHY OF BOTH FEET: Status: RESOLVED | Noted: 2017-03-08 | Resolved: 2018-09-25

## 2018-09-25 PROCEDURE — 1100F PTFALLS ASSESS-DOCD GE2>/YR: CPT | Mod: CPTII,,, | Performed by: PSYCHIATRY & NEUROLOGY

## 2018-09-25 PROCEDURE — 3074F SYST BP LT 130 MM HG: CPT | Mod: CPTII,,, | Performed by: PSYCHIATRY & NEUROLOGY

## 2018-09-25 PROCEDURE — 3078F DIAST BP <80 MM HG: CPT | Mod: CPTII,,, | Performed by: PSYCHIATRY & NEUROLOGY

## 2018-09-25 PROCEDURE — 99999 PR PBB SHADOW E&M-EST. PATIENT-LVL III: CPT | Mod: PBBFAC,,, | Performed by: PSYCHIATRY & NEUROLOGY

## 2018-09-25 PROCEDURE — 3288F FALL RISK ASSESSMENT DOCD: CPT | Mod: CPTII,,, | Performed by: PSYCHIATRY & NEUROLOGY

## 2018-09-25 PROCEDURE — 99204 OFFICE O/P NEW MOD 45 MIN: CPT | Mod: S$PBB,,, | Performed by: PSYCHIATRY & NEUROLOGY

## 2018-09-25 PROCEDURE — 99213 OFFICE O/P EST LOW 20 MIN: CPT | Mod: PBBFAC | Performed by: PSYCHIATRY & NEUROLOGY

## 2018-09-25 NOTE — PROGRESS NOTES
Subjective:       Patient ID: Solo Cid is a 68 y.o. male.    Chief Complaint: Consult (Seizure)    HPI     The patient is accompanied by a friend who contributed to the history. In  he had an episode where he woke up finding himself on the floor. The last thing he recalled was being on the sofa.  He was alone with no witness. He was unclear about tongue biting but might have had urine incontinence. No know history of seizures or similar spells. He blacked out 6 months ago when he felt overheated. He continues to drive. On 08- Brain was obtained and was unremarkable.       Review of Systems   Constitutional: Negative for appetite change and fatigue.   HENT: Negative for hearing loss and tinnitus.    Eyes: Negative for photophobia and visual disturbance.   Respiratory: Negative for apnea and shortness of breath.    Cardiovascular: Negative for chest pain and palpitations.   Gastrointestinal: Negative for nausea and vomiting.   Endocrine: Negative for cold intolerance and heat intolerance.   Genitourinary: Negative for difficulty urinating and urgency.   Musculoskeletal: Positive for neck pain. Negative for arthralgias, back pain, gait problem, joint swelling and myalgias.   Skin: Negative for color change and rash.   Allergic/Immunologic: Negative for environmental allergies and immunocompromised state.   Neurological: Positive for dizziness and syncope. Negative for tremors, seizures, facial asymmetry, speech difficulty, weakness, light-headedness, numbness and headaches.   Hematological: Negative for adenopathy. Does not bruise/bleed easily.   Psychiatric/Behavioral: Negative for agitation, behavioral problems, confusion, decreased concentration, dysphoric mood, hallucinations, self-injury, sleep disturbance and suicidal ideas. The patient is not nervous/anxious and is not hyperactive.          Current Outpatient Medications:     ergocalciferol (ERGOCALCIFEROL) 50,000 unit Cap, TAKE 1 CAPSULE  BY MOUTH EVERY 7 DAYS, Disp: 8 capsule, Rfl: 0    finasteride (PROSCAR) 5 mg tablet, TAKE 1 TABLET(5 MG) BY MOUTH EVERY DAY, Disp: 90 tablet, Rfl: 11    HYDROcodone-acetaminophen (NORCO)  mg per tablet, Take by mouth., Disp: , Rfl:     LONSURF 20-8.19 mg Tab, , Disp: , Rfl:     omeprazole (PRILOSEC) 40 MG capsule, , Disp: , Rfl: 3    polyethylene glycol (GLYCOLAX) 17 gram/dose powder, Take 17 g by mouth once daily., Disp: 119 g, Rfl: 11    tamsulosin (FLOMAX) 0.4 mg Cp24, Take 1 capsule (0.4 mg total) by mouth once daily., Disp: 30 capsule, Rfl: 11    tiZANidine (ZANAFLEX) 4 MG tablet, TAKE 1 TABLET(4 MG) BY MOUTH EVERY 6 HOURS AS NEEDED, Disp: 385 tablet, Rfl: 1    ranitidine (ZANTAC) 150 MG tablet, TAKE 1 TABLET(150 MG) BY MOUTH TWICE DAILY, Disp: 180 tablet, Rfl: 1  Past Medical History:   Diagnosis Date    Abnormal LFTs     Antineoplastic chemotherapy induced anemia 7/25/2017    Arthritis     Back pain     Chronic bronchitis     Diabetes mellitus without complication     A1c 6.3% 2017 on metformin 500 mg QD only    Gastrostomy status     Hepatitis C antibody positive in blood     Hypertension     Neoplastic (malignant) related fatigue     Obesity     Pancytopenia 12/12/2017    Present currently 2/2 chemo    Peripheral neuropathy 03/08/2017    2/2 chemo; Noted by NP during preventive visit; oncologist, Dr. Siddiqui, is aware    Rectal cancer metastasized to lung 04/30/2014    Stage IIIB, [id number]; s/p s;p Abdominoperineal resec 5/29/15, path: 0.75 cm tumor, neg margins, grade 2, 0 of 14 LN pos, path T3N0. XRT/CTX (Oncologist: Dr. Gonzalez Siddiqui: [phone]/Abdominal Perineal Resec(Proctologist: Dr. Shivam Reynaga); s/p 5-FU, rad 02/19/15-3/30/15 Dr. Shrestha. FOLFOX 8/10/15-1/25/16; 5/25/18 CT Chest, abn/pel: mult pulm nodules SUSAN needle bx +met adenocarcinoma    Visual disturbance 03/08/2017    bilateral blurry vision reported to NP on prevention visit    Weight loss,  non-intentional     Appetite decreased but now improving     Past Surgical History:   Procedure Laterality Date    CERVICAL FUSION      COLOSTOMY  2015    MEDIPORT INSERTION, SINGLE  2015     Social History     Socioeconomic History    Marital status: Single     Spouse name: Not on file    Number of children: Not on file    Years of education: Not on file    Highest education level: Not on file   Social Needs    Financial resource strain: Not on file    Food insecurity - worry: Not on file    Food insecurity - inability: Not on file    Transportation needs - medical: Not on file    Transportation needs - non-medical: Not on file   Occupational History    Not on file   Tobacco Use    Smoking status: Former Smoker     Last attempt to quit: 1/21/2015     Years since quitting: 3.6    Smokeless tobacco: Never Used   Substance and Sexual Activity    Alcohol use: No    Drug use: No    Sexual activity: Not Currently   Other Topics Concern    Not on file   Social History Narrative    Not on file       Objective:     GENERAL APPEARANCE:     The patient looks comfortable.    No signs of medical or psychiatric distress.    Normal breathing pattern.    No dysmorphic features    Normal eye contact.     GENERAL MEDICAL EXAM:    HEENT:  Head is atraumatic normocephalic. No tender temporal arteries.     Neck and Axillae: No JVD. No carotid bruits. No thyromegaly. No lymphadenopathy.    Cardiopulmonary: No cyanosis. No tachypnea. Normal respiratory effort.  Clear breath sounds. Normal heart sounds with regular rhythm and no murmurs.    Gastrointestinal:  LLQ Colostomy. No hernias.  Abdomen is soft non-tender. No masses or organomegaly.    Skin, Hair and Nails: No pathognonomic skin rash. No neurofibromatosis.   No stigmata of autoimmune disease.     Limbs: No varicose veins. No edema. Symmetric pulses.     Muskoskeletal: No deformities.No spine tenderness.   No signs of longstanding neuropathy. No dislocations or  fractures.        Neurologic Exam     Mental Status   Oriented to person, place, and time.   Registration: recalls 3 of 3 objects. Recall at 5 minutes: recalls 3 of 3 objects. Follows 3 step commands.   Attention: normal. Concentration: normal.   Speech: speech is normal   Level of consciousness: alert  Knowledge: good and consistent with education. Able to perform simple calculations.   Able to name object. Able to read. Able to repeat. Able to write. Normal comprehension.     Cranial Nerves     CN II   Visual fields full to confrontation.   Visual acuity: normal  Right visual field deficit: none  Left visual field deficit: none     CN III, IV, VI   Pupils are equal, round, and reactive to light.  Extraocular motions are normal.   Right pupil: Size: 2 mm. Shape: regular. Reactivity: brisk. Consensual response: intact. Accommodation: intact.   Left pupil: Size: 2 mm. Shape: regular. Reactivity: brisk. Consensual response: intact. Accommodation: intact.   CN III: no CN III palsy  CN VI: no CN VI palsy  Nystagmus: none   Diplopia: none  Ophthalmoparesis: none  Upgaze: normal  Downgaze: normal  Conjugate gaze: present  Vestibulo-ocular reflex: present    CN V   Facial sensation intact.   Right facial sensation deficit: none  Left facial sensation deficit: none  Right corneal reflex: normal  Left corneal reflex: normal    CN VII   Right facial weakness: none  Left facial weakness: none  Right taste: normal  Left taste: normal    CN VIII   CN VIII normal.   Hearing: intact  Right Rinne: AC > BC  Left Rinne: AC > BC  Fowler: does not lateralize     CN IX, X   CN IX normal.   CN X normal.   Palate: symmetric  Right gag reflex: normal  Left gag reflex: normal    CN XI   CN XI normal.   Right sternocleidomastoid strength: normal  Left sternocleidomastoid strength: normal  Right trapezius strength: normal  Left trapezius strength: normal    CN XII   CN XII normal.   Tongue: not atrophic  Fasciculations: absent  Tongue  deviation: none    Motor Exam   Muscle bulk: normal  Overall muscle tone: normal  Right arm tone: normal  Left arm tone: normal  Right arm pronator drift: absent  Left arm pronator drift: absent  Right leg tone: normal  Left leg tone: normal    Strength   Strength 5/5 throughout.   Right neck flexion: 5/5  Left neck flexion: 5/5  Right neck extension: 5/5  Left neck extension: 5/5  Right deltoid: 5/5  Left deltoid: 5/5  Right biceps: 5/5  Left biceps: 5/5  Right triceps: 5/5  Left triceps: 5/5  Right wrist flexion: 5/5  Left wrist flexion: 5/5  Right wrist extension: 5/5  Left wrist extension: /5  Right interossei: 55  Left interossei: /  Right abdominals: 5/  Left abdominals: 5  Right iliopsoas: 5  Left iliopsoas:   Right quadriceps:   Left quadriceps: 5  Right hamstrin/5  Left hamstrin/5  Right glutei:   Left glutei:   Right anterior tibial: 5/  Left anterior tibial: 5/  Right posterior tibial: 5/  Left posterior tibial: 5/  Right peroneal: 5  Left peroneal:   Right gastroc: /  Left gastroc:     Sensory Exam   Right arm light touch: normal  Left arm light touch: normal  Right leg light touch: decreased from toes  Left leg light touch: decreased from toes  Vibration normal.   Right arm vibration: normal  Left arm vibration: normal  Right leg vibration: normal  Left leg vibration: normal  Proprioception normal.   Right arm proprioception: normal  Left arm proprioception: normal  Right leg proprioception: normal  Left leg proprioception: normal  Right arm pinprick: normal  Left arm pinprick: normal  Right leg pinprick: decreased from toes  Left leg pinprick: decreased from toes  Graphesthesia: normal  Stereognosis: normal    Gait, Coordination, and Reflexes     Gait  Gait: normal    Coordination   Romberg: negative  Finger to nose coordination: normal  Heel to shin coordination: normal  Tandem walking coordination: normal    Tremor   Resting tremor: absent  Intention  tremor: absent  Action tremor: absent    Reflexes   Right brachioradialis: 2+  Left brachioradialis: 2+  Right biceps: 2+  Left biceps: 2+  Right triceps: 2+  Left triceps: 2+  Right patellar: 2+  Left patellar: 2+  Right achilles: 1+  Left achilles: 1+  Right : 2+  Left : 2+  Right plantar: normal  Left plantar: normal  Right Wagoner: absent  Left Wagoner: absent  Right ankle clonus: absent  Left ankle clonus: absent  Right pendular knee jerk: absent  Left pendular knee jerk: absent      Lab Results   Component Value Date    WBC 4.26 07/23/2018    HGB 11.2 (L) 07/23/2018    HCT 34.7 (L) 07/23/2018    MCV 99 (H) 07/23/2018     07/23/2018     Sodium   Date Value Ref Range Status   07/23/2018 135 (L) 136 - 145 mmol/L Final     Potassium   Date Value Ref Range Status   07/23/2018 3.9 3.5 - 5.1 mmol/L Final     Chloride   Date Value Ref Range Status   07/23/2018 100 95 - 110 mmol/L Final     CO2   Date Value Ref Range Status   07/23/2018 27 23 - 29 mmol/L Final     Glucose   Date Value Ref Range Status   07/23/2018 91 70 - 110 mg/dL Final     BUN, Bld   Date Value Ref Range Status   08/30/2018 14 8 - 23 mg/dL Final     Creatinine   Date Value Ref Range Status   08/30/2018 0.8 0.5 - 1.4 mg/dL Final     Calcium   Date Value Ref Range Status   07/23/2018 9.6 8.7 - 10.5 mg/dL Final     Total Protein   Date Value Ref Range Status   07/23/2018 8.0 6.0 - 8.4 g/dL Final     Albumin   Date Value Ref Range Status   07/23/2018 3.4 (L) 3.5 - 5.2 g/dL Final     Total Bilirubin   Date Value Ref Range Status   07/23/2018 0.9 0.1 - 1.0 mg/dL Final     Comment:     For infants and newborns, interpretation of results should be based  on gestational age, weight and in agreement with clinical  observations.  Premature Infant recommended reference ranges:  Up to 24 hours.............<8.0 mg/dL  Up to 48 hours............<12.0 mg/dL  3-5 days..................<15.0 mg/dL  6-29 days.................<15.0 mg/dL       Alkaline  Phosphatase   Date Value Ref Range Status   07/23/2018 96 55 - 135 U/L Final     AST   Date Value Ref Range Status   07/23/2018 111 (H) 10 - 40 U/L Final     ALT   Date Value Ref Range Status   07/23/2018 81 (H) 10 - 44 U/L Final     Anion Gap   Date Value Ref Range Status   07/23/2018 8 8 - 16 mmol/L Final     eGFR if    Date Value Ref Range Status   08/30/2018 >60 >60 mL/min/1.73 m^2 Final     eGFR if non    Date Value Ref Range Status   08/30/2018 >60 >60 mL/min/1.73 m^2 Final     Comment:     Calculation used to obtain the estimated glomerular filtration  rate (eGFR) is the CKD-EPI equation.        No results found for: [id number]  Lab Results   Component Value Date    TSH 1.297 07/23/2018 08-    Brain MRI.    I reviewed it personally and is unremarkable      Assessment:       1. Episode of loss of consciousness    2. Seizure-like activity    3. Convulsive syncope    4. Driving safety issue        Plan:       The circumstances surrounding the LOC spell remain unclear.    EEG followed by A-EEG if inconclusive     Recommend cardiology evaluation    I staci instructed him to maintain LOC precautions which include but not limited to avoid driving, avoid high altitudes, avoid being close to fire or fire source, avoid being close to a body of water or swimming alone, avoid operating heavy machinery and avoid using sharp objects if possible. The patient was encouraged to shower (without accumulation of water) instead of taking a bath if unsupervised.The patient was also advised not to care for children without company. The patient was advised to pad the side rails with pillows and blankets if applicable and sleep as close to the floor as possible. I strongly recommended lowering the bed to the floor level to decrease the risk of falls. I also instructed the patient to avoid safety sensitive duties. In general, any activity that requires full awareness and would result in  serious injury to self and others if a seizure occurs should be avoided. The patient verbalized full understanding.      RTC in 2-4 weeks

## 2018-09-25 NOTE — LETTER
September 25, 2018      Jude Malagon MD  [address] [facility][1]  [place] [address]           [facility]  [id number] [place] [id number]  Phone: [phone]  Fax: [phone]          Patient: Solo Cid   MR Number: [id number]   YOB: 1950   Date of Visit: 9/25/2018       Dear Dr. Jude Malagon:    Thank you for referring Solo Cid to me for evaluation. Attached you will find relevant portions of my assessment and plan of care.    If you have questions, please do not hesitate to call me. I look forward to following Solo Cid along with you.    Sincerely,    Cecily Grider MD    Enclosure  CC:  No Recipients    If you would like to receive this communication electronically, please contact [email or web address] or [phone] to request more information on [employer] Link access.    For providers and/or their staff who would like to refer a patient to [facility], please contact us through our one-stop-shop provider referral line, [facility], at [phone].    If you feel you have received this communication in error or would no longer like to receive these types of communications, please e-mail [email or web address]

## 2018-09-25 NOTE — PATIENT INSTRUCTIONS
Electroencephalography (EEG)    Electroencephalography (EEG) is a test that measures your brain wave activity. It is used to assess your brain function. Brain cells (or neurons) communicate by producing electrical signals. These signals are measured by the EEG and any abnormalities are detected.  The EEG is safe and painless.  What is EEG used for?  Your doctor may order this test to check for seizures or other brain problems. For this test, several small metal disks (electrodes) are attached to the scalp with adhesives, or with water-based gel or paste. During the test, wavy lines (waveforms) appear on a screen or on paper. They will be studied to assess your brain function. In some people who are prone to seizures, parts of this test may slightly increase their chance of having a seizure. Sometimes it is necessary to repeat an EEG with sleep deprivation. EEG may be performed in a doctor's office or a hospital lab. The test typically takes less than an h our, although much of the time is spent attaching the electrodes. Sometimes, the electrodes are left on for several hours or days so that the EEG test can record brain waves for a longer periods of time. In these cases, you may need to stay in the hospital or can go home with a portable EEG recorder.  Before your test  Prepare for your test as instructed. Wash and dry your hair. But, don't use any hairstyling products. Your scalp and hair should be clean and free of excess oil. Take your routine medications, unless told not to. You may be asked to sleep during the EEG. To help you do this, you may be told to stay up all or part of the night before the test. Or, you may be given medication to help you sleep during the test. If so, someone will need to drive you home after the test. Your test will take about 60 minutes. Arrive with enough time to check in.  My next appointment is:  ______________________________   For your safety and for the success of your test,  tell the technologist about:  · Any medications or herbs you take  · Any seizures you may have had in the past   Date Last Reviewed: 10/19/2015  © 7493-0562 [employer]. [address] [place] [address], [place] [address]. All rights reserved. This information is not intended as a substitute for professional medical care. Always follow your healthcare professional's instructions.

## 2018-10-02 ENCOUNTER — HOSPITAL ENCOUNTER (OUTPATIENT)
Dept: PULMONOLOGY | Facility: HOSPITAL | Age: 68
Discharge: HOME | End: 2018-10-02
Attending: PSYCHIATRY & NEUROLOGY
Payer: MEDICARE

## 2018-10-02 DIAGNOSIS — R56.9 SEIZURE-LIKE ACTIVITY: ICD-10-CM

## 2018-10-02 DIAGNOSIS — R55 CONVULSIVE SYNCOPE: ICD-10-CM

## 2018-10-02 DIAGNOSIS — R55 EPISODE OF LOSS OF CONSCIOUSNESS: ICD-10-CM

## 2018-10-02 PROCEDURE — 95816 EEG AWAKE AND DROWSY: CPT

## 2018-10-02 NOTE — PROGRESS NOTES
EEG REPORT       DATE: 10-       LEVEL OF CONSCIOUSENESS    Awake and Sleep.       EEG BACKGROUND    The posterior dominant basic rhythm reaches 8-9 Hz, symmetric, reactive, well-modulated and well-sustained.       EEG CLASSIFICATION    Normal      IMPRESSION      The EEG is normal in the awake and sleep states. There are no epileptiform discharges or lateralizing signs . No typical events were recorded. There is no electrographic evidence of seizure. There is no electrographic evidence of status epilpeticus.       Please note that a nonepileptiform EEG does not rule out epilepsy.         Cecily Grider MD, FAAN    Diplomate, American Board of Psychiatry and Neurology  Diplomate, American Board of Clinical Neurophysiology

## 2018-10-02 NOTE — PROGRESS NOTES
This note has been moved to another encounter. If you have any questions, please contact [facility] at [phone].

## 2018-10-09 ENCOUNTER — TELEPHONE (OUTPATIENT)
Dept: INTERNAL MEDICINE | Facility: CLINIC | Age: 68
End: 2018-10-09

## 2018-10-09 NOTE — TELEPHONE ENCOUNTER
----- Message from Jaret Shah sent at 10/9/2018  9:18 AM CDT -----  Contact: Sisi-Pt's sister  She is calling in regards to the pt's sugar levels, he is currently weak and wobbles when he walks, please advise  [phone]

## 2018-10-09 NOTE — TELEPHONE ENCOUNTER
Called pt to try to slide the pt in a slot that was opened on our schedule by manager no answer unable to LM due to phone line being busy./db** -will try again

## 2018-10-09 NOTE — TELEPHONE ENCOUNTER
Pts sister called to inform us that pt has been really shaky and fatigued and weak. pts sister states that the pt hasnt been able to make it to the bathroom and has been voiding on himself. Pts sister states that she believes that it has something to do with the pt not being on his metformin anymore (pt has been off since July 23 of this year). Pts sister requested same day apt but none were available (due to provider being out) next available is Thursday pt was placed in morning slot. pts sister was informed that if pt worsens to please take pt to [facility] ER for evaluation. pts sister voiced understanding.Please advise./db**

## 2018-10-11 ENCOUNTER — OFFICE VISIT (OUTPATIENT)
Dept: INTERNAL MEDICINE | Facility: CLINIC | Age: 68
End: 2018-10-11
Payer: MEDICARE

## 2018-10-11 VITALS
TEMPERATURE: 97 F | DIASTOLIC BLOOD PRESSURE: 70 MMHG | BODY MASS INDEX: 23.65 KG/M2 | RESPIRATION RATE: 18 BRPM | SYSTOLIC BLOOD PRESSURE: 136 MMHG | OXYGEN SATURATION: 99 % | HEIGHT: 72 IN | WEIGHT: 174.63 LBS | HEART RATE: 98 BPM

## 2018-10-11 DIAGNOSIS — R32 URINARY INCONTINENCE, UNSPECIFIED TYPE: ICD-10-CM

## 2018-10-11 DIAGNOSIS — R27.0 ATAXIA: Primary | ICD-10-CM

## 2018-10-11 DIAGNOSIS — R53.1 WEAKNESS: ICD-10-CM

## 2018-10-11 DIAGNOSIS — R73.9 HYPERGLYCEMIA: ICD-10-CM

## 2018-10-11 DIAGNOSIS — R55 SYNCOPE, UNSPECIFIED SYNCOPE TYPE: ICD-10-CM

## 2018-10-11 LAB
BACTERIA #/AREA URNS AUTO: NORMAL /HPF
BILIRUB UR QL STRIP: NEGATIVE
CLARITY UR REFRACT.AUTO: CLEAR
COLOR UR AUTO: YELLOW
GLUCOSE UR QL STRIP: NEGATIVE
HGB UR QL STRIP: ABNORMAL
KETONES UR QL STRIP: NEGATIVE
LEUKOCYTE ESTERASE UR QL STRIP: ABNORMAL
MICROSCOPIC COMMENT: NORMAL
NITRITE UR QL STRIP: NEGATIVE
PH UR STRIP: 5 [PH] (ref 5–8)
PROT UR QL STRIP: NEGATIVE
RBC #/AREA URNS AUTO: 3 /HPF (ref 0–4)
SP GR UR STRIP: 1.02 (ref 1–1.03)
SQUAMOUS #/AREA URNS AUTO: 1 /HPF
URATE CRY UR QL COMP ASSIST: NORMAL
URN SPEC COLLECT METH UR: ABNORMAL
UROBILINOGEN UR STRIP-ACNC: 4 EU/DL
WBC #/AREA URNS AUTO: 2 /HPF (ref 0–5)

## 2018-10-11 PROCEDURE — 3078F DIAST BP <80 MM HG: CPT | Mod: CPTII,,, | Performed by: INTERNAL MEDICINE

## 2018-10-11 PROCEDURE — 81001 URINALYSIS AUTO W/SCOPE: CPT

## 2018-10-11 PROCEDURE — 99999 PR PBB SHADOW E&M-EST. PATIENT-LVL V: CPT | Mod: PBBFAC,,, | Performed by: INTERNAL MEDICINE

## 2018-10-11 PROCEDURE — 99215 OFFICE O/P EST HI 40 MIN: CPT | Mod: PBBFAC,PN,25 | Performed by: INTERNAL MEDICINE

## 2018-10-11 PROCEDURE — 93010 ELECTROCARDIOGRAM REPORT: CPT | Mod: ,,, | Performed by: INTERNAL MEDICINE

## 2018-10-11 PROCEDURE — 93005 ELECTROCARDIOGRAM TRACING: CPT | Mod: PBBFAC,PN | Performed by: INTERNAL MEDICINE

## 2018-10-11 PROCEDURE — 3075F SYST BP GE 130 - 139MM HG: CPT | Mod: CPTII,,, | Performed by: INTERNAL MEDICINE

## 2018-10-11 PROCEDURE — 1101F PT FALLS ASSESS-DOCD LE1/YR: CPT | Mod: CPTII,,, | Performed by: INTERNAL MEDICINE

## 2018-10-11 PROCEDURE — 99215 OFFICE O/P EST HI 40 MIN: CPT | Mod: S$PBB,,, | Performed by: INTERNAL MEDICINE

## 2018-10-11 NOTE — PROGRESS NOTES
Subjective:      Patient ID: Solo Cid is a 68 y.o. male.    Chief Complaint: Dizziness      HPI     Mr. Solo Cid is a patient of Jude Malagon MD, who presents for dizziness on Thursday 10/4/18 urinary frequency, dizziness, weakness, difficulty ambulating, diaphoresis, and urinary incontinence, which lasted for three days. No f/c, no back pain, no n/v, no chest pain, no sob. He tried to restart his metformin thinking that his blood sugar must be very high.       Past Medical History:   Diagnosis Date    Abnormal LFTs     Antineoplastic chemotherapy induced anemia 7/25/2017    Arthritis     Back pain     Chronic bronchitis     Diabetes mellitus without complication     A1c 6.3% 2017 on metformin 500 mg QD only    Gastrostomy status     Hepatitis C antibody positive in blood     Hypertension     LOC (loss of consciousness) 09/25/2018    Unclear etiology per Dr. Cecily Grider in neurology clinic, who rec CV eval and continued LOC precautions    Neoplastic (malignant) related fatigue     Neuropathy of both feet 3/8/2017    Pancytopenia 12/12/2017    Present currently 2/2 chemo    Peripheral neuropathy 03/08/2017    2/2 chemo; Noted by NP during preventive visit; oncologist, Dr. Siddiqui, is aware    Polyneuropathy 9/25/2018    Rectal cancer metastasized to lung 04/30/2014    Stage IIIB, [id number]; s/p s;p Abdominoperineal resec 5/29/15, path: 0.75 cm tumor, neg margins, grade 2, 0 of 14 LN pos, path T3N0. XRT/CTX (Oncologist: Dr. Gonzalez Siddiqui: [phone]/Abdominal Perineal Resec(Proctologist: Dr. Shivam Reynaga); s/p 5-FU, rad 02/19/15-3/30/15 Dr. Shrestha. FOLFOX 8/10/15-1/25/16; 5/25/18 CT Chest, abn/pel: mult pulm nodules SUSAN needle bx +met adenocarcinoma    Visual disturbance 03/08/2017    bilateral blurry vision reported to NP on prevention visit    Weight loss, non-intentional     Appetite decreased but now improving     Past Surgical History:   Procedure Laterality Date     CERVICAL FUSION      COLOSTOMY  2015    MEDIPORT INSERTION, SINGLE  2015     Social History     Socioeconomic History    Marital status: Single     Spouse name: Not on file    Number of children: Not on file    Years of education: Not on file    Highest education level: Not on file   Social Needs    Financial resource strain: Not on file    Food insecurity - worry: Not on file    Food insecurity - inability: Not on file    Transportation needs - medical: Not on file    Transportation needs - non-medical: Not on file   Occupational History    Not on file   Tobacco Use    Smoking status: Former Smoker     Last attempt to quit: 1/21/2015     Years since quitting: 3.7    Smokeless tobacco: Never Used   Substance and Sexual Activity    Alcohol use: No    Drug use: No    Sexual activity: Not Currently   Other Topics Concern    Not on file   Social History Narrative    Not on file     Family History   Problem Relation Age of Onset    Hypertension Mother     Diabetes Mother     Kidney disease Father         Dialysis    Hypertension Father     Stroke Father     Hypertension Sister     Heart attack Brother     Heart attack Brother     Cancer Neg Hx        Current Outpatient Medications:     ergocalciferol (ERGOCALCIFEROL) 50,000 unit Cap, TAKE 1 CAPSULE BY MOUTH EVERY 7 DAYS, Disp: 8 capsule, Rfl: 0    finasteride (PROSCAR) 5 mg tablet, TAKE 1 TABLET(5 MG) BY MOUTH EVERY DAY, Disp: 90 tablet, Rfl: 11    HYDROcodone-acetaminophen (NORCO)  mg per tablet, Take by mouth., Disp: , Rfl:     LONSURF 20-8.19 mg Tab, , Disp: , Rfl:     omeprazole (PRILOSEC) 40 MG capsule, , Disp: , Rfl: 3    polyethylene glycol (GLYCOLAX) 17 gram/dose powder, Take 17 g by mouth once daily., Disp: 119 g, Rfl: 11    ranitidine (ZANTAC) 150 MG tablet, TAKE 1 TABLET(150 MG) BY MOUTH TWICE DAILY, Disp: 180 tablet, Rfl: 1    tamsulosin (FLOMAX) 0.4 mg Cp24, Take 1 capsule (0.4 mg total) by mouth once daily., Disp:  30 capsule, Rfl: 11    tiZANidine (ZANAFLEX) 4 MG tablet, TAKE 1 TABLET(4 MG) BY MOUTH EVERY 6 HOURS AS NEEDED, Disp: 385 tablet, Rfl: 1    Review of patient's allergies indicates:   Allergen Reactions    Pork/porcine containing products         Review of Systems   All remaining systems negative    Objective:     /70 (BP Location: Left arm, Patient Position: Sitting, BP Method: Large (Manual))   Pulse 98   Temp 97.2 °F (36.2 °C) (Tympanic)   Resp 18   Ht 6' (1.829 m)   Wt 79.2 kg (174 lb 9.7 oz)   SpO2 99%   BMI 23.68 kg/m²     Physical Exam  GEN: A&O fully, NAD  PSYC: Normal affect      Lab Results   Component Value Date    WBC 4.26 07/23/2018    HGB 11.2 (L) 07/23/2018    HCT 34.7 (L) 07/23/2018     07/23/2018    CHOL 137 07/23/2018    TRIG 81 07/23/2018    HDL 46 07/23/2018    LDLCALC 74.8 07/23/2018    ALT 81 (H) 07/23/2018     (H) 07/23/2018     (L) 07/23/2018    K 3.9 07/23/2018     07/23/2018    CREATININE 0.8 08/30/2018    BUN 14 08/30/2018    CO2 27 07/23/2018    CALCIUM 9.6 07/23/2018    PSA 0.67 03/17/2017    HGBA1C 4.8 07/23/2018       Assessment:      1. Ataxia, acute: Resolved. Concerning for met to brain causing seizure, particularly given recent episode of syncope, which did not occur this episode. Recent MRI brain & EEG unremarkable. Will refer back to Dr. Cecily Grider, particularly since he wanted to see pt back in 2-4 weeks on 9/25/18.   2. Weakness, acute: Resolved. As above.    3. Urinary incontinence, unspecified type: Resolved. Will u/a and A1c given pt's and sister's concern for hyperglycemia given his recent weaning off of metformin last year.        Plan:   Ataxia  -     Ambulatory consult to Neurology  -     Hemoglobin A1c; Future; Expected date: 10/11/2018  -     Ambulatory consult to Cardiology    Weakness  -     Ambulatory consult to Neurology  -     Hemoglobin A1c; Future; Expected date: 10/11/2018  -     Cancel: Ambulatory consult to Cardiology  -      Ambulatory consult to Cardiology    Urinary incontinence, unspecified type  -     Ambulatory consult to Neurology  -     URINALYSIS  -     Hemoglobin A1c; Future; Expected date: 10/11/2018    Hyperglycemia  -     Hemoglobin A1c; Future; Expected date: 10/11/2018        Follow-up in about 4 weeks (around 11/8/2018), or if symptoms worsen or fail to improve, for FU on urinary incontinence, ataxia, weakness.    I spent 25 minutes of time with patient 50% or more of which was discussing labs and plans of care.

## 2018-10-19 ENCOUNTER — OFFICE VISIT (OUTPATIENT)
Dept: CARDIOLOGY | Facility: CLINIC | Age: 68
End: 2018-10-19
Payer: MEDICARE

## 2018-10-19 VITALS
HEART RATE: 100 BPM | SYSTOLIC BLOOD PRESSURE: 112 MMHG | DIASTOLIC BLOOD PRESSURE: 60 MMHG | WEIGHT: 172.19 LBS | BODY MASS INDEX: 23.32 KG/M2 | HEIGHT: 72 IN

## 2018-10-19 DIAGNOSIS — I10 ESSENTIAL HYPERTENSION: ICD-10-CM

## 2018-10-19 DIAGNOSIS — R55 EPISODE OF LOSS OF CONSCIOUSNESS: ICD-10-CM

## 2018-10-19 DIAGNOSIS — R55 SYNCOPE AND COLLAPSE: Primary | ICD-10-CM

## 2018-10-19 DIAGNOSIS — R40.20 LOC (LOSS OF CONSCIOUSNESS): ICD-10-CM

## 2018-10-19 PROCEDURE — 3078F DIAST BP <80 MM HG: CPT | Mod: CPTII,,, | Performed by: INTERNAL MEDICINE

## 2018-10-19 PROCEDURE — 99204 OFFICE O/P NEW MOD 45 MIN: CPT | Mod: S$PBB,,, | Performed by: INTERNAL MEDICINE

## 2018-10-19 PROCEDURE — 99999 PR PBB SHADOW E&M-EST. PATIENT-LVL III: CPT | Mod: PBBFAC,,, | Performed by: INTERNAL MEDICINE

## 2018-10-19 PROCEDURE — 99213 OFFICE O/P EST LOW 20 MIN: CPT | Mod: PBBFAC,PO | Performed by: INTERNAL MEDICINE

## 2018-10-19 PROCEDURE — 1101F PT FALLS ASSESS-DOCD LE1/YR: CPT | Mod: CPTII,,, | Performed by: INTERNAL MEDICINE

## 2018-10-19 PROCEDURE — 3074F SYST BP LT 130 MM HG: CPT | Mod: CPTII,,, | Performed by: INTERNAL MEDICINE

## 2018-10-19 PROCEDURE — 99499 UNLISTED E&M SERVICE: CPT | Mod: S$GLB,,, | Performed by: INTERNAL MEDICINE

## 2018-10-19 NOTE — LETTER
October 19, 2018      Jude Malagon MD  [address]   [address]  [place] [address]           [facility] - Cardiology  [address]  [place] [address]  Phone: [phone]  Fax: [phone]          Patient: Solo Cid   MR Number: [id number]   YOB: 1950   Date of Visit: 10/19/2018       Dear Dr. Jude Malagon:    Thank you for referring Solo Cid to me for evaluation. Attached you will find relevant portions of my assessment and plan of care.    If you have questions, please do not hesitate to call me. I look forward to following Solo Cid along with you.    Sincerely,    Ronen Moses MD    Enclosure  CC:  No Recipients    If you would like to receive this communication electronically, please contact [email or web address] or [phone] to request more information on [employer] access.    For providers and/or their staff who would like to refer a patient to [facility], please contact us through our one-stop-shop provider referral line, [facility], at [phone].    If you feel you have received this communication in error or would no longer like to receive these types of communications, please e-mail [email or web address]

## 2018-10-19 NOTE — PROGRESS NOTES
"Subjective:   Patient ID:  Solo Cid is a 68 y.o. male who presents for cardiac consult of Hypertension and Gait Problem      HPI  The patient came in today for cardiac consult of Hypertension and Gait Problem    Referring Physician: Jude Malagon MD   Reason for consult: syncope, ataxia    10/19/18  This is a 68 year old male pt with current medical conditions HTN, DM, Hep C, rectal CA with mets, GERD presents for initial CV evaluation of ataxia and syncope.     Pt had a syncopal episode last month, evaluated by neurologist for possible seizure. He was watching TV on sofa, woke up at 4 and went to kitchen and then was on back, does not remember much around that time. He woke up on the kitchen floor. He laid there for about 30 min could not get up. He was very weak. He had another syncopal episode in the past - "many moons ago", during hot weather but unsure details.   Occ headaches.     Patient feels no chest pain, no sob, no leg swelling, no PND, no palpitation.     Patient has fairly good exercise tolerance.    Patient is compliant with medications.    FH - no CV disease    ECG 10/11/18  Normal sinus rhythm  Cannot exclude Septal infarct ,age undetermined    Past Medical History:   Diagnosis Date    Abnormal LFTs     Antineoplastic chemotherapy induced anemia 7/25/2017    Arthritis     Back pain     Chronic bronchitis     Convulsive syncope     Diabetes mellitus without complication     A1c 6.3% 2017 on metformin 500 mg QD only    Gastrostomy status     Hepatitis C antibody positive in blood     Hypertension     LOC (loss of consciousness) 09/25/2018    Unclear etiology per Dr. Cecily Grider in neurology clinic, who rec CV eval and continued LOC precautions    Neoplastic (malignant) related fatigue     Neuropathy of both feet 3/8/2017    Pancytopenia 12/12/2017    Present currently 2/2 chemo    Peripheral neuropathy 03/08/2017    2/2 chemo; Noted by NP during preventive visit; oncologist,  " Shows, is aware    Polyneuropathy 9/25/2018    Rectal cancer metastasized to lung 04/30/2014    Stage IIIB, [id number]; s/p s;p Abdominoperineal resec 5/29/15, path: 0.75 cm tumor, neg margins, grade 2, 0 of 14 LN pos, path T3N0. XRT/CTX (Oncologist: Dr. Roth Shows: [phone]/Abdominal Perineal Resec(Proctologist: Dr. Shivam Reynaga); s/p 5-FU, rad 02/19/15-3/30/15 Dr. Shrestha. FOLFOX 8/10/15-1/25/16; 5/25/18 CT Chest, abn/pel: mult pulm nodules SUSAN needle bx +met adenocarcinoma    Visual disturbance 03/08/2017    bilateral blurry vision reported to NP on prevention visit    Weight loss, non-intentional     Appetite decreased but now improving       Past Surgical History:   Procedure Laterality Date    CERVICAL FUSION      COLOSTOMY  2015    MEDIPORT INSERTION, SINGLE  2015       Social History     Tobacco Use    Smoking status: Former Smoker     Last attempt to quit: 1/21/2015     Years since quitting: 3.7    Smokeless tobacco: Never Used   Substance Use Topics    Alcohol use: No    Drug use: No       Family History   Problem Relation Age of Onset    Hypertension Mother     Diabetes Mother     Kidney disease Father         Dialysis    Hypertension Father     Stroke Father     Hypertension Sister     Heart attack Brother     Heart attack Brother     Cancer Neg Hx           Medication List           Accurate as of 10/19/18 10:23 AM. If you have any questions, ask your nurse or doctor.               CONTINUE taking these medications    ergocalciferol 50,000 unit Cap  Commonly known as:  ERGOCALCIFEROL  TAKE 1 CAPSULE BY MOUTH EVERY 7 DAYS     finasteride 5 mg tablet  Commonly known as:  PROSCAR  TAKE 1 TABLET(5 MG) BY MOUTH EVERY DAY     HYDROcodone-acetaminophen  mg per tablet  Commonly known as:  NORCO     LONSURF 20-8.19 mg Tab  Generic drug:  trifluridine-tipiracil     omeprazole 40 MG capsule  Commonly known as:  PRILOSEC     polyethylene glycol 17 gram/dose powder  Commonly  known as:  GLYCOLAX  Take 17 g by mouth once daily.     ranitidine 150 MG tablet  Commonly known as:  ZANTAC  TAKE 1 TABLET(150 MG) BY MOUTH TWICE DAILY     tamsulosin 0.4 mg Cap  Commonly known as:  FLOMAX  Take 1 capsule (0.4 mg total) by mouth once daily.     tiZANidine 4 MG tablet  Commonly known as:  ZANAFLEX  TAKE 1 TABLET(4 MG) BY MOUTH EVERY 6 HOURS AS NEEDED            Review of Systems   Constitutional: Negative.    HENT: Negative.    Eyes: Negative.    Respiratory: Negative.  Negative for shortness of breath.    Cardiovascular: Negative.  Negative for chest pain and palpitations.   Gastrointestinal: Negative.    Genitourinary: Negative.    Musculoskeletal: Negative.    Skin: Negative.    Neurological: Positive for loss of consciousness and headaches.   Endo/Heme/Allergies: Negative.    Psychiatric/Behavioral: Negative.    All 12 systems otherwise negative.      Wt Readings from Last 3 Encounters:   10/19/18 78.1 kg (172 lb 2.9 oz)   10/11/18 79.2 kg (174 lb 9.7 oz)   09/25/18 81.5 kg (179 lb 10.8 oz)     Temp Readings from Last 3 Encounters:   10/11/18 97.2 °F (36.2 °C) (Tympanic)   08/27/18 96 °F (35.6 °C) (Tympanic)   07/23/18 98.9 °F (37.2 °C) (Tympanic)     BP Readings from Last 3 Encounters:   10/19/18 112/60   10/11/18 136/70   09/25/18 126/62     Pulse Readings from Last 3 Encounters:   10/19/18 100   10/11/18 98   09/25/18 84       /60 (BP Location: Right arm, Patient Position: Sitting, BP Method: Large (Manual))   Pulse 100   Ht 6' (1.829 m)   Wt 78.1 kg (172 lb 2.9 oz)   BMI 23.35 kg/m²     Objective:   Physical Exam   Constitutional: He is oriented to person, place, and time. He appears well-developed and well-nourished. No distress.   HENT:   Head: Normocephalic and atraumatic.   Nose: Nose normal.   Mouth/Throat: Oropharynx is clear and moist.   Eyes: Conjunctivae and EOM are normal. No scleral icterus.   Neck: Normal range of motion. Neck supple. No JVD present. No thyromegaly  present.   Cardiovascular: Normal rate, regular rhythm, S1 normal and S2 normal. Exam reveals no gallop, no S3, no S4 and no friction rub.   No murmur heard.  Pulmonary/Chest: Effort normal and breath sounds normal. No stridor. No respiratory distress. He has no wheezes. He has no rales. He exhibits no tenderness.   Abdominal: Soft. Bowel sounds are normal. He exhibits no distension and no mass. There is no tenderness. There is no rebound.   Genitourinary:   Genitourinary Comments: Deferred   Musculoskeletal: Normal range of motion. He exhibits no edema, tenderness or deformity.   Lymphadenopathy:     He has no cervical adenopathy.   Neurological: He is alert and oriented to person, place, and time. He exhibits normal muscle tone. Coordination normal.   Skin: Skin is warm and dry. No rash noted. He is not diaphoretic. No erythema. No pallor.   Psychiatric: He has a normal mood and affect. His behavior is normal. Judgment and thought content normal.   Nursing note and vitals reviewed.      Lab Results   Component Value Date     (L) 07/23/2018    K 3.9 07/23/2018     07/23/2018    CO2 27 07/23/2018    BUN 14 08/30/2018    CREATININE 0.8 08/30/2018    GLU 91 07/23/2018    HGBA1C 4.8 07/23/2018     (H) 07/23/2018    ALT 81 (H) 07/23/2018    ALBUMIN 3.4 (L) 07/23/2018    PROT 8.0 07/23/2018    BILITOT 0.9 07/23/2018    WBC 4.26 07/23/2018    HGB 11.2 (L) 07/23/2018    HCT 34.7 (L) 07/23/2018    MCV 99 (H) 07/23/2018     07/23/2018    TSH 1.297 07/23/2018    CHOL 137 07/23/2018    HDL 46 07/23/2018    LDLCALC 74.8 07/23/2018    TRIG 81 07/23/2018     Assessment:      1. Syncope and collapse    2. LOC (loss of consciousness)    3. Essential hypertension    4. Episode of loss of consciousness        Plan:   1. Syncope  - check 2d ECHO  - Holter  - Carotid u/s  - discussed will order stress if echo has WMA/dec function    Thank you for allowing me to participate in this patient's care. Please do not  hesitate to contact me with any questions or concerns. Consult note has been forwarded to the referral physician.

## 2018-10-19 NOTE — PATIENT INSTRUCTIONS
Causes of Syncope  Syncope (fainting) has many causes. Sometimes it is not serious. In other cases, syncope is a sign of a heart problem. But treatment can help    When syncope is not serious  Your healthcare provider may call your problem vasovagal syncope, reflex syncope, or orthostatic hypotension. These types of syncope are generally not serious. They can be caused by:  · Strong feelings, such as anxiety or fear. A nerve signal may briefly change your heart rate and lower your blood pressure too much.  · Standing for too long. Standing may cause blood to pool in your legs. When this happens, your brain may not receive all the blood it needs.  · Standing up too quickly. Your blood pressure may not adjust fast enough to changes in posture and may drop too low. Certain medicines can also cause this problem. Examples of medicines that can cause a drop in blood pressure include diuretics, blood pressure medicines, and medicines for chest pain. Your pharmacist or healthcare provider can discuss these with you.  · Reaction to normal body functions. When you go to the bathroom, have gastrointestinal discomfort, nausea, or pain, your heart may have a natural reflex to slow down and lower blood pressure. This can result in syncope. This may also follow exercise, eating, laughter, weight lifting, or playing musical instruments like the trumpet or trombone.  When heart trouble causes syncope  A heart problem can decrease the amount of oxygen-rich blood that reaches the brain. Heart trouble can be serious and even life threatening if not treated:  · A slow heart rate. Electrical signals tell the chambers of the heart when to pump. But the signals may be slowed or blocked (heart block) as they travel on the hearts electrical pathways. This can be caused by aging, scarred heart tissue, or damage from heart disease. When the heart rate slows, not enough blood is pumped.  · A fast heart rate. Certain problems can make the  heart race. For instance, after a heart attack, also known as acute myocardial infarction, or AMI, abnormal electrical signals may be created. These signals can make the heart suddenly beat very fast. The heart pumps before the chambers can fill with blood. So less blood reaches the brain and other parts of the body. Illegal drugs, certain medicines, heart disease, or an inherited condition can also cause this.  · A heart valve problem. Blood travels through the chambers of the heart as it is pumped. Heart valves open and close to help move blood in the right direction. But a valve may not open or close fully, if its hardened or scarred. As a result, less blood is pumped through the heart to the brain and body. Most often, syncope occurs when a person's aortic valve is critically narrowed and he or she participates in  a strenuous activity.  · A heart muscle problem. Some people develop a thickened heart muscle that blocks blood flow out of the heart to the body. This is called hypertrophic cardiomyopathy. Being dehydrated and having hypertrophic cardiomyopathy can increase the risk for syncope.  Whatever the cause of syncope, it is important to be evaluated by your healthcare provider. You may need to be seen by a cardiologist, neurologist, or an ear, nose, and throat specialist. Do not drive, operate heavy machinery, or participate in activities in which you would be at risk for falls and injury if you have syncope and have not been evaluated.  Date Last Reviewed: 5/1/2016  © 1958-8772 [employer]. [address] [place] [address], [place] [address]. All rights reserved. This information is not intended as a substitute for professional medical care. Always follow your healthcare professional's instructions.

## 2018-10-23 ENCOUNTER — OFFICE VISIT (OUTPATIENT)
Dept: NEUROLOGY | Facility: CLINIC | Age: 68
End: 2018-10-23
Payer: MEDICARE

## 2018-10-23 VITALS
DIASTOLIC BLOOD PRESSURE: 68 MMHG | BODY MASS INDEX: 23.7 KG/M2 | SYSTOLIC BLOOD PRESSURE: 122 MMHG | WEIGHT: 169.31 LBS | HEIGHT: 71 IN | HEART RATE: 72 BPM

## 2018-10-23 DIAGNOSIS — R55 EPISODE OF LOSS OF CONSCIOUSNESS: ICD-10-CM

## 2018-10-23 DIAGNOSIS — I10 HYPERTENSION, UNSPECIFIED TYPE: ICD-10-CM

## 2018-10-23 DIAGNOSIS — R55 CONVULSIVE SYNCOPE: ICD-10-CM

## 2018-10-23 DIAGNOSIS — R56.9 SEIZURE-LIKE ACTIVITY: Primary | ICD-10-CM

## 2018-10-23 DIAGNOSIS — G62.9 POLYNEUROPATHY: ICD-10-CM

## 2018-10-23 DIAGNOSIS — R40.20 LOC (LOSS OF CONSCIOUSNESS): ICD-10-CM

## 2018-10-23 PROCEDURE — 3078F DIAST BP <80 MM HG: CPT | Mod: CPTII,,, | Performed by: PSYCHIATRY & NEUROLOGY

## 2018-10-23 PROCEDURE — 99499 UNLISTED E&M SERVICE: CPT | Mod: S$GLB,,, | Performed by: PSYCHIATRY & NEUROLOGY

## 2018-10-23 PROCEDURE — 99213 OFFICE O/P EST LOW 20 MIN: CPT | Mod: PBBFAC | Performed by: PSYCHIATRY & NEUROLOGY

## 2018-10-23 PROCEDURE — 99999 PR PBB SHADOW E&M-EST. PATIENT-LVL III: CPT | Mod: PBBFAC,,, | Performed by: PSYCHIATRY & NEUROLOGY

## 2018-10-23 PROCEDURE — 3288F FALL RISK ASSESSMENT DOCD: CPT | Mod: CPTII,,, | Performed by: PSYCHIATRY & NEUROLOGY

## 2018-10-23 PROCEDURE — 1100F PTFALLS ASSESS-DOCD GE2>/YR: CPT | Mod: CPTII,,, | Performed by: PSYCHIATRY & NEUROLOGY

## 2018-10-23 PROCEDURE — 3074F SYST BP LT 130 MM HG: CPT | Mod: CPTII,,, | Performed by: PSYCHIATRY & NEUROLOGY

## 2018-10-23 PROCEDURE — 99214 OFFICE O/P EST MOD 30 MIN: CPT | Mod: S$PBB,,, | Performed by: PSYCHIATRY & NEUROLOGY

## 2018-10-23 NOTE — PATIENT INSTRUCTIONS
Electroencephalography (EEG)    Electroencephalography (EEG) is a test that measures your brain wave activity. It is used to assess your brain function. Brain cells (or neurons) communicate by producing electrical signals. These signals are measured by the EEG and any abnormalities are detected.  The EEG is safe and painless.  What is EEG used for?  Your doctor may order this test to check for seizures or other brain problems. For this test, several small metal disks (electrodes) are attached to the scalp with adhesives, or with water-based gel or paste. During the test, wavy lines (waveforms) appear on a screen or on paper. They will be studied to assess your brain function. In some people who are prone to seizures, parts of this test may slightly increase their chance of having a seizure. Sometimes it is necessary to repeat an EEG with sleep deprivation. EEG may be performed in a doctor's office or a hospital lab. The test typically takes less than an h our, although much of the time is spent attaching the electrodes. Sometimes, the electrodes are left on for several hours or days so that the EEG test can record brain waves for a longer periods of time. In these cases, you may need to stay in the hospital or can go home with a portable EEG recorder.  Before your test  Prepare for your test as instructed. Wash and dry your hair. But, don't use any hairstyling products. Your scalp and hair should be clean and free of excess oil. Take your routine medications, unless told not to. You may be asked to sleep during the EEG. To help you do this, you may be told to stay up all or part of the night before the test. Or, you may be given medication to help you sleep during the test. If so, someone will need to drive you home after the test. Your test will take about 60 minutes. Arrive with enough time to check in.  My next appointment is:  ______________________________   For your safety and for the success of your test,  tell the technologist about:  · Any medications or herbs you take  · Any seizures you may have had in the past   Date Last Reviewed: 10/19/2015  © 9949-5476 [employer] [address] [place] [address], [place] [address]. All rights reserved. This information is not intended as a substitute for professional medical care. Always follow your healthcare professional's instructions.         Elsy Mejia

## 2018-10-23 NOTE — PROGRESS NOTES
Subjective:       Patient ID: Solo Cid is a 68 y.o. male.    Chief Complaint: Follow-up    HPI       BACKGROUND HISTORY       The patient is accompanied by a friend who contributed to the history. In  he had an episode where he woke up finding himself on the floor. The last thing he recalled was being on the sofa.  He was alone with no witness. He was unclear about tongue biting but might have had urine incontinence. No know history of seizures or similar spells. He blacked out 6 months ago when he felt overheated. He continues to drive. On 08- Brain was obtained and was unremarkable.  I ordered EEG and seizure precautions and cardiac eval.     INTERVAL HISTORY     10- EEG is NL. No new spells. He is accompanied by his sister today. He will undergo cardiac monitoring.          Review of Systems   Constitutional: Negative for appetite change and fatigue.   HENT: Negative for hearing loss and tinnitus.    Eyes: Negative for photophobia and visual disturbance.   Respiratory: Negative for apnea and shortness of breath.    Cardiovascular: Negative for chest pain and palpitations.   Gastrointestinal: Negative for nausea and vomiting.   Endocrine: Negative for cold intolerance and heat intolerance.   Genitourinary: Negative for difficulty urinating and urgency.   Musculoskeletal: Negative for arthralgias, back pain, gait problem, joint swelling, myalgias, neck pain and neck stiffness.   Skin: Negative for color change and rash.   Allergic/Immunologic: Negative for environmental allergies and immunocompromised state.   Neurological: Positive for syncope. Negative for dizziness, tremors, seizures, facial asymmetry, speech difficulty, weakness, light-headedness, numbness and headaches.   Hematological: Negative for adenopathy. Does not bruise/bleed easily.   Psychiatric/Behavioral: Negative for agitation, behavioral problems, confusion, decreased concentration, dysphoric mood, hallucinations,  self-injury, sleep disturbance and suicidal ideas. The patient is not nervous/anxious and is not hyperactive.          Current Outpatient Medications:     finasteride (PROSCAR) 5 mg tablet, TAKE 1 TABLET(5 MG) BY MOUTH EVERY DAY, Disp: 90 tablet, Rfl: 11    HYDROcodone-acetaminophen (NORCO)  mg per tablet, Take by mouth., Disp: , Rfl:     LONSURF 20-8.19 mg Tab, , Disp: , Rfl:     omeprazole (PRILOSEC) 40 MG capsule, , Disp: , Rfl: 3    polyethylene glycol (GLYCOLAX) 17 gram/dose powder, Take 17 g by mouth once daily., Disp: 119 g, Rfl: 11    ranitidine (ZANTAC) 150 MG tablet, TAKE 1 TABLET(150 MG) BY MOUTH TWICE DAILY, Disp: 180 tablet, Rfl: 1    tamsulosin (FLOMAX) 0.4 mg Cp24, Take 1 capsule (0.4 mg total) by mouth once daily., Disp: 30 capsule, Rfl: 11    tiZANidine (ZANAFLEX) 4 MG tablet, TAKE 1 TABLET(4 MG) BY MOUTH EVERY 6 HOURS AS NEEDED, Disp: 385 tablet, Rfl: 1    ergocalciferol (ERGOCALCIFEROL) 50,000 unit Cap, TAKE 1 CAPSULE BY MOUTH EVERY 7 DAYS, Disp: 8 capsule, Rfl: 0  Past Medical History:   Diagnosis Date    Abnormal LFTs     Antineoplastic chemotherapy induced anemia 7/25/2017    Arthritis     Back pain     Chronic bronchitis     Convulsive syncope     Diabetes mellitus without complication     A1c 6.3% 2017 on metformin 500 mg QD only    Gastrostomy status     Hepatitis C antibody positive in blood     Hypertension     LOC (loss of consciousness) 09/25/2018    Unclear etiology per Dr. Cecily Grider in neurology clinic, who rec CV eval and continued LOC precautions    Neoplastic (malignant) related fatigue     Neuropathy of both feet 3/8/2017    Pancytopenia 12/12/2017    Present currently 2/2 chemo    Peripheral neuropathy 03/08/2017    2/2 chemo; Noted by NP during preventive visit; oncologist, Dr. Siddiqui, is aware    Polyneuropathy 9/25/2018    Rectal cancer metastasized to lung 04/30/2014    Stage IIIB, [id number]; s/p s;p Abdominoperineal resec 5/29/15, path: 0.75 cm  tumor, neg margins, grade 2, 0 of 14 LN pos, path T3N0. XRT/CTX (Oncologist: Dr. Roth Shows: [phone]/Abdominal Perineal Resec(Proctologist: Dr. Shivam Reynaga); s/p 5-FU, rad 02/19/15-3/30/15 Dr. Shrestha. FOLFOX 8/10/15-1/25/16; 5/25/18 CT Chest, abn/pel: mult pulm nodules SUSAN needle bx +met adenocarcinoma    Visual disturbance 03/08/2017    bilateral blurry vision reported to NP on prevention visit    Weight loss, non-intentional     Appetite decreased but now improving     Past Surgical History:   Procedure Laterality Date    CERVICAL FUSION      COLOSTOMY  2015    MEDIPORT INSERTION, SINGLE  2015     Social History     Socioeconomic History    Marital status: Single     Spouse name: Not on file    Number of children: Not on file    Years of education: Not on file    Highest education level: Not on file   Social Needs    Financial resource strain: Not on file    Food insecurity - worry: Not on file    Food insecurity - inability: Not on file    Transportation needs - medical: Not on file    Transportation needs - non-medical: Not on file   Occupational History    Not on file   Tobacco Use    Smoking status: Former Smoker     Last attempt to quit: 1/21/2015     Years since quitting: 3.7    Smokeless tobacco: Never Used   Substance and Sexual Activity    Alcohol use: No    Drug use: No    Sexual activity: Not Currently   Other Topics Concern    Not on file   Social History Narrative    Not on file       Objective:     GENERAL APPEARANCE:     The patient looks comfortable.    No signs of medical or psychiatric distress.    Normal breathing pattern.    No dysmorphic features    Normal eye contact.     GENERAL MEDICAL EXAM:    HEENT:  Head is atraumatic normocephalic.     Neck and Axillae: No JVD.     Cardiopulmonary: No cyanosis. No tachypnea. Normal respiratory effort.    Gastrointestinal:  LLQ Colostomy. No hernias.    Skin, Hair and Nails: No pathognonomic skin rash. No  neurofibromatosis. No stigmata of autoimmune disease.     Limbs: No varicose veins. No edema. Symmetric pulses.     Muskoskeletal: No deformities.No signs of longstanding neuropathy. No dislocations or fractures.      Neurologic Exam     Mental Status   Oriented to person, place, and time.   Registration: recalls 3 of 3 objects. Recall at 5 minutes: recalls 3 of 3 objects. Follows 3 step commands.   Attention: normal. Concentration: normal.   Speech: speech is normal   Level of consciousness: alert  Knowledge: good and consistent with education. Able to perform simple calculations.   Able to name object. Able to read. Able to repeat. Able to write. Normal comprehension.     Cranial Nerves     CN II   Visual fields full to confrontation.   Visual acuity: normal  Right visual field deficit: none  Left visual field deficit: none     CN III, IV, VI   Pupils are equal, round, and reactive to light.  Extraocular motions are normal.   Right pupil: Size: 2 mm. Shape: regular. Reactivity: brisk. Consensual response: intact. Accommodation: intact.   Left pupil: Size: 2 mm. Shape: regular. Reactivity: brisk. Consensual response: intact. Accommodation: intact.   CN III: no CN III palsy  CN VI: no CN VI palsy  Nystagmus: none   Diplopia: none  Ophthalmoparesis: none  Upgaze: normal  Downgaze: normal  Conjugate gaze: present  Vestibulo-ocular reflex: present    CN V   Facial sensation intact.   Right facial sensation deficit: none  Left facial sensation deficit: none  Right corneal reflex: normal  Left corneal reflex: normal    CN VII   Right facial weakness: none  Left facial weakness: none  Right taste: normal  Left taste: normal    CN VIII   CN VIII normal.   Hearing: intact  Right Rinne: AC > BC  Left Rinne: AC > BC  Fowler: does not lateralize     CN IX, X   CN IX normal.   CN X normal.   Palate: symmetric  Right gag reflex: normal  Left gag reflex: normal    CN XI   CN XI normal.   Right sternocleidomastoid strength:  normal  Left sternocleidomastoid strength: normal  Right trapezius strength: normal  Left trapezius strength: normal    CN XII   CN XII normal.   Tongue: not atrophic  Fasciculations: absent  Tongue deviation: none    Motor Exam   Muscle bulk: normal  Overall muscle tone: normal  Right arm tone: normal  Left arm tone: normal  Right arm pronator drift: absent  Left arm pronator drift: absent  Right leg tone: normal  Left leg tone: normal    Strength   Strength 5/5 throughout.   Right neck flexion: 5/5  Left neck flexion: 5/5  Right neck extension: 5/5  Left neck extension: 5/5  Right deltoid: 5/5  Left deltoid: 5/5  Right biceps: 5/5  Left biceps: 5/5  Right triceps: 5/5  Left triceps: 5/5  Right wrist flexion: 5/5  Left wrist flexion: 5/5  Right wrist extension: 5/5  Left wrist extension: 5/5  Right interossei: 5/5  Left interossei: 5/5  Right abdominals: 5/5  Left abdominals: 5/5  Right iliopsoas: 5/5  Left iliopsoas: 5/5  Right quadriceps: 5/5  Left quadriceps: 5/5  Right hamstrin/5  Left hamstrin/5  Right glutei: 5/5  Left glutei: 5/5  Right anterior tibial: 5/5  Left anterior tibial: 5/5  Right posterior tibial: 5/5  Left posterior tibial: 5/5  Right peroneal: 5/5  Left peroneal: 5/5  Right gastroc: 5/5  Left gastroc: 5/5    Sensory Exam   Right arm light touch: normal  Left arm light touch: normal  Right leg light touch: decreased from toes  Left leg light touch: decreased from toes  Vibration normal.   Right arm vibration: normal  Left arm vibration: normal  Right leg vibration: normal  Left leg vibration: normal  Proprioception normal.   Right arm proprioception: normal  Left arm proprioception: normal  Right leg proprioception: normal  Left leg proprioception: normal  Right arm pinprick: normal  Left arm pinprick: normal  Right leg pinprick: decreased from toes  Left leg pinprick: decreased from toes  Graphesthesia: normal  Stereognosis: normal    Gait, Coordination, and Reflexes     Gait  Gait:  normal    Coordination   Romberg: negative  Finger to nose coordination: normal  Heel to shin coordination: normal  Tandem walking coordination: normal    Tremor   Resting tremor: absent  Intention tremor: absent  Action tremor: absent    Reflexes   Right brachioradialis: 2+  Left brachioradialis: 2+  Right biceps: 2+  Left biceps: 2+  Right triceps: 2+  Left triceps: 2+  Right patellar: 2+  Left patellar: 2+  Right achilles: 1+  Left achilles: 1+  Right : 2+  Left : 2+  Right plantar: normal  Left plantar: normal  Right Wagoner: absent  Left Wagoner: absent  Right ankle clonus: absent  Left ankle clonus: absent  Right pendular knee jerk: absent  Left pendular knee jerk: absent      Lab Results   Component Value Date    WBC 4.26 07/23/2018    HGB 11.2 (L) 07/23/2018    HCT 34.7 (L) 07/23/2018    MCV 99 (H) 07/23/2018     07/23/2018     Sodium   Date Value Ref Range Status   07/23/2018 135 (L) 136 - 145 mmol/L Final     Potassium   Date Value Ref Range Status   07/23/2018 3.9 3.5 - 5.1 mmol/L Final     Chloride   Date Value Ref Range Status   07/23/2018 100 95 - 110 mmol/L Final     CO2   Date Value Ref Range Status   07/23/2018 27 23 - 29 mmol/L Final     Glucose   Date Value Ref Range Status   07/23/2018 91 70 - 110 mg/dL Final     BUN, Bld   Date Value Ref Range Status   08/30/2018 14 8 - 23 mg/dL Final     Creatinine   Date Value Ref Range Status   08/30/2018 0.8 0.5 - 1.4 mg/dL Final     Calcium   Date Value Ref Range Status   07/23/2018 9.6 8.7 - 10.5 mg/dL Final     Total Protein   Date Value Ref Range Status   07/23/2018 8.0 6.0 - 8.4 g/dL Final     Albumin   Date Value Ref Range Status   07/23/2018 3.4 (L) 3.5 - 5.2 g/dL Final     Total Bilirubin   Date Value Ref Range Status   07/23/2018 0.9 0.1 - 1.0 mg/dL Final     Comment:     For infants and newborns, interpretation of results should be based  on gestational age, weight and in agreement with clinical  observations.  Premature Infant  recommended reference ranges:  Up to 24 hours.............<8.0 mg/dL  Up to 48 hours............<12.0 mg/dL  3-5 days..................<15.0 mg/dL  6-29 days.................<15.0 mg/dL       Alkaline Phosphatase   Date Value Ref Range Status   07/23/2018 96 55 - 135 U/L Final     AST   Date Value Ref Range Status   07/23/2018 111 (H) 10 - 40 U/L Final     ALT   Date Value Ref Range Status   07/23/2018 81 (H) 10 - 44 U/L Final     Anion Gap   Date Value Ref Range Status   07/23/2018 8 8 - 16 mmol/L Final     eGFR if    Date Value Ref Range Status   08/30/2018 >60 >60 mL/min/1.73 m^2 Final     eGFR if non    Date Value Ref Range Status   08/30/2018 >60 >60 mL/min/1.73 m^2 Final     Comment:     Calculation used to obtain the estimated glomerular filtration  rate (eGFR) is the CKD-EPI equation.        No results found for: [id number]  Lab Results   Component Value Date    TSH 1.297 07/23/2018 08-    Brain MRI.    I reviewed it personally and is unremarkable      EEG REPORT       DATE: 10-       LEVEL OF CONSCIOUSENESS    Awake and Sleep.       EEG BACKGROUND    The posterior dominant basic rhythm reaches 8-9 Hz, symmetric, reactive, well-modulated and well-sustained.       EEG CLASSIFICATION    Normal      IMPRESSION      The EEG is normal in the awake and sleep states. There are no epileptiform discharges or lateralizing signs . No typical events were recorded. There is no electrographic evidence of seizure. There is no electrographic evidence of status epilpeticus.       Please note that a nonepileptiform EEG does not rule out epilepsy.         Cecily Grider MD, FAAN    Diplomate, American Board of Psychiatry and Neurology  Diplomate, American Board of Clinical Neurophysiology       Assessment:       1. Seizure-like activity    2. Polyneuropathy    3. LOC (loss of consciousness)    4. Hypertension, unspecified type    5. Episode of loss of consciousness    6.  Convulsive syncope        Plan:       The circumstances surrounding the LOC spell remain unclear.    A-EEG    Recommend cardiology evaluation    I staci instructed him to maintain LOC precautions which include but not limited to avoid driving, avoid high altitudes, avoid being close to fire or fire source, avoid being close to a body of water or swimming alone, avoid operating heavy machinery and avoid using sharp objects if possible. The patient was encouraged to shower (without accumulation of water) instead of taking a bath if unsupervised.The patient was also advised not to care for children without company. The patient was advised to pad the side rails with pillows and blankets if applicable and sleep as close to the floor as possible. I strongly recommended lowering the bed to the floor level to decrease the risk of falls. I also instructed the patient to avoid safety sensitive duties. In general, any activity that requires full awareness and would result in serious injury to self and others if a seizure occurs should be avoided. The patient verbalized full understanding.      RTC with results

## 2018-10-29 ENCOUNTER — PATIENT OUTREACH (OUTPATIENT)
Dept: ADMINISTRATIVE | Facility: HOSPITAL | Age: 68
End: 2018-10-29

## 2018-11-12 ENCOUNTER — OFFICE VISIT (OUTPATIENT)
Dept: INTERNAL MEDICINE | Facility: CLINIC | Age: 68
End: 2018-11-12
Payer: MEDICARE

## 2018-11-12 VITALS
BODY MASS INDEX: 24.57 KG/M2 | DIASTOLIC BLOOD PRESSURE: 60 MMHG | WEIGHT: 175.5 LBS | SYSTOLIC BLOOD PRESSURE: 102 MMHG | HEART RATE: 96 BPM | HEIGHT: 71 IN | OXYGEN SATURATION: 98 % | TEMPERATURE: 99 F | RESPIRATION RATE: 20 BRPM

## 2018-11-12 DIAGNOSIS — R55 SYNCOPE, UNSPECIFIED SYNCOPE TYPE: Primary | ICD-10-CM

## 2018-11-12 DIAGNOSIS — D64.9 ANEMIA, UNSPECIFIED TYPE: ICD-10-CM

## 2018-11-12 PROCEDURE — 99213 OFFICE O/P EST LOW 20 MIN: CPT | Mod: HCNC,S$GLB,, | Performed by: INTERNAL MEDICINE

## 2018-11-12 PROCEDURE — 3074F SYST BP LT 130 MM HG: CPT | Mod: CPTII,HCNC,S$GLB, | Performed by: INTERNAL MEDICINE

## 2018-11-12 PROCEDURE — 1101F PT FALLS ASSESS-DOCD LE1/YR: CPT | Mod: CPTII,HCNC,S$GLB, | Performed by: INTERNAL MEDICINE

## 2018-11-12 PROCEDURE — 99999 PR PBB SHADOW E&M-EST. PATIENT-LVL III: CPT | Mod: PBBFAC,HCNC,, | Performed by: INTERNAL MEDICINE

## 2018-11-12 PROCEDURE — 3078F DIAST BP <80 MM HG: CPT | Mod: CPTII,HCNC,S$GLB, | Performed by: INTERNAL MEDICINE

## 2018-11-12 NOTE — PROGRESS NOTES
Subjective:      Patient ID: Solo Cid is a 68 y.o. male.    Chief Complaint: Follow-up (1 month**) and Gait Problem      HPI     Mr. Solo Cid is a patient of Jude Malagon MD, who presents for f/u on dizziness, which resolved. He reports not needing to use his antiacid medication since last visit due to avoiding red sauces.     He reports a complete resolution of urinary incontinence and problems walking. He was seen by Dr. Grider, who reviewed his EEG, which was unremarkable and recommended cardiac evaluation, which he is currently undergoing with Holter monitor, carotid u/s and ECHO. No further episodes of LOC.      Past Medical History:   Diagnosis Date    Abnormal LFTs     Antineoplastic chemotherapy induced anemia 7/25/2017    Arthritis     Back pain     Chronic bronchitis     Convulsive syncope     Diabetes mellitus without complication     A1c 6.3% 2017 on metformin 500 mg QD only    Gastrostomy status     Hepatitis C antibody positive in blood     History of hypertension     Cured with weight loss    LOC (loss of consciousness) 09/25/2018    Unclear etiology per Dr. Cecily Grider in neurology clinic, who rec CV eval and continued LOC precautions    Neoplastic (malignant) related fatigue     Neuropathy of both feet 3/8/2017    Pancytopenia 12/12/2017    Present currently 2/2 chemo    Peripheral neuropathy 03/08/2017    2/2 chemo; Noted by NP during preventive visit; oncologist, Dr. Siddiqui, is aware    Polyneuropathy 9/25/2018    Rectal cancer metastasized to lung 04/30/2014    Stage IIIB, [id number]; s/p s;p Abdominoperineal resec 5/29/15, path: 0.75 cm tumor, neg margins, grade 2, 0 of 14 LN pos, path T3N0. XRT/CTX (Oncologist: Dr. Gonzalez Siddiqui: [phone]/Abdominal Perineal Resec(Proctologist: Dr. Shivam Reynaga); s/p 5-FU, rad 02/19/15-3/30/15 Dr. Shrestha. FOLFOX 8/10/15-1/25/16; 5/25/18 CT Chest, abn/pel: mult pulm nodules SUSAN needle bx +met adenocarcinoma    Visual  disturbance 03/08/2017    bilateral blurry vision reported to NP on prevention visit    Weight loss, non-intentional     Appetite decreased but now improving     Past Surgical History:   Procedure Laterality Date    CERVICAL FUSION      COLOSTOMY  2015    MEDIPORT INSERTION, SINGLE  2015     Social History     Socioeconomic History    Marital status: Single     Spouse name: Not on file    Number of children: Not on file    Years of education: Not on file    Highest education level: Not on file   Social Needs    Financial resource strain: Not on file    Food insecurity - worry: Not on file    Food insecurity - inability: Not on file    Transportation needs - medical: Not on file    Transportation needs - non-medical: Not on file   Occupational History    Not on file   Tobacco Use    Smoking status: Former Smoker     Last attempt to quit: 1/21/2015     Years since quitting: 3.8    Smokeless tobacco: Never Used   Substance and Sexual Activity    Alcohol use: No    Drug use: No    Sexual activity: Not Currently   Other Topics Concern    Not on file   Social History Narrative    Not on file     Family History   Problem Relation Age of Onset    Hypertension Mother     Diabetes Mother     Kidney disease Father         Dialysis    Hypertension Father     Stroke Father     Hypertension Sister     Heart attack Brother     Heart attack Brother     Cancer Neg Hx        Current Outpatient Medications:     ergocalciferol (ERGOCALCIFEROL) 50,000 unit Cap, TAKE 1 CAPSULE BY MOUTH EVERY 7 DAYS, Disp: 8 capsule, Rfl: 0    finasteride (PROSCAR) 5 mg tablet, TAKE 1 TABLET(5 MG) BY MOUTH EVERY DAY, Disp: 90 tablet, Rfl: 11    HYDROcodone-acetaminophen (NORCO)  mg per tablet, Take by mouth., Disp: , Rfl:     LONSURF 20-8.19 mg Tab, , Disp: , Rfl:     polyethylene glycol (GLYCOLAX) 17 gram/dose powder, Take 17 g by mouth once daily., Disp: 119 g, Rfl: 11    ranitidine (ZANTAC) 150 MG tablet, TAKE  "1 TABLET(150 MG) BY MOUTH TWICE DAILY, Disp: 180 tablet, Rfl: 1    tamsulosin (FLOMAX) 0.4 mg Cp24, Take 1 capsule (0.4 mg total) by mouth once daily., Disp: 30 capsule, Rfl: 11    tiZANidine (ZANAFLEX) 4 MG tablet, TAKE 1 TABLET(4 MG) BY MOUTH EVERY 6 HOURS AS NEEDED, Disp: 385 tablet, Rfl: 1    Review of patient's allergies indicates:   Allergen Reactions    Pork/porcine containing products         Review of Systems   All remaining systems negative    Objective:     /60 (BP Location: Left arm, Patient Position: Sitting, BP Method: Large (Manual))   Pulse 96   Temp 98.9 °F (37.2 °C) (Tympanic)   Resp 20   Ht 5' 11" (1.803 m)   Wt 79.6 kg (175 lb 7.8 oz)   SpO2 98%   BMI 24.48 kg/m²     Physical Exam  GEN: A&O fully, NAD  PSYC: Normal affect      Lab Results   Component Value Date    WBC 4.26 07/23/2018    HGB 11.2 (L) 07/23/2018    HCT 34.7 (L) 07/23/2018     07/23/2018    CHOL 137 07/23/2018    TRIG 81 07/23/2018    HDL 46 07/23/2018    LDLCALC 74.8 07/23/2018    ALT 81 (H) 07/23/2018     (H) 07/23/2018     (L) 07/23/2018    K 3.9 07/23/2018     07/23/2018    CREATININE 0.8 08/30/2018    BUN 14 08/30/2018    CO2 27 07/23/2018    CALCIUM 9.6 07/23/2018    PSA 0.67 03/17/2017    HGBA1C 4.8 07/23/2018       Assessment:      1. Syncope, unspecified syncope type: Resolved. Will continue cardiac evaluation.     2. Anemia, unspecified type: Likely related to elevated CEA. Continue f/u with oncology.    3.      Transaminitis: As above. Will check LFTs in 3 months.     Plan:   Syncope, unspecified syncope type    Anemia, unspecified type    Other orders  -     Cancel: Hypertension Digital [facility] ([facility]) Enrollment Order  -     Cancel: Hypertension Digital Medicine ([facility]): Assign Onboarding Questionnaires        Follow-up in about 3 months (around 2/12/2019), or if symptoms worsen or fail to improve, for FU on transaminitis.    I spent 25 minutes of time with patient 50% or more of " which was discussing labs and plans of care.

## 2018-12-17 ENCOUNTER — TELEPHONE (OUTPATIENT)
Dept: NEUROLOGY | Facility: CLINIC | Age: 68
End: 2018-12-17

## 2019-01-22 ENCOUNTER — OFFICE VISIT (OUTPATIENT)
Dept: NEUROLOGY | Facility: CLINIC | Age: 69
End: 2019-01-22
Payer: MEDICARE

## 2019-01-22 VITALS
HEIGHT: 71 IN | WEIGHT: 174.81 LBS | BODY MASS INDEX: 24.47 KG/M2 | HEART RATE: 96 BPM | SYSTOLIC BLOOD PRESSURE: 106 MMHG | DIASTOLIC BLOOD PRESSURE: 60 MMHG

## 2019-01-22 DIAGNOSIS — E11.9 DIABETES MELLITUS WITHOUT COMPLICATION: ICD-10-CM

## 2019-01-22 DIAGNOSIS — Z93.1 GASTROSTOMY STATUS: ICD-10-CM

## 2019-01-22 DIAGNOSIS — G62.9 POLYNEUROPATHY: ICD-10-CM

## 2019-01-22 DIAGNOSIS — R55 CONVULSIVE SYNCOPE: Primary | ICD-10-CM

## 2019-01-22 DIAGNOSIS — E85.9 AMYLOIDOSIS, UNSPECIFIED TYPE: ICD-10-CM

## 2019-01-22 DIAGNOSIS — K21.9 GASTROESOPHAGEAL REFLUX DISEASE WITHOUT ESOPHAGITIS: ICD-10-CM

## 2019-01-22 DIAGNOSIS — R40.20 LOC (LOSS OF CONSCIOUSNESS): ICD-10-CM

## 2019-01-22 DIAGNOSIS — Z85.048 HISTORY OF RECTAL CANCER: ICD-10-CM

## 2019-01-22 DIAGNOSIS — R79.89 ABNORMAL LFTS: ICD-10-CM

## 2019-01-22 DIAGNOSIS — I10 ESSENTIAL HYPERTENSION: ICD-10-CM

## 2019-01-22 DIAGNOSIS — R55 EPISODE OF LOSS OF CONSCIOUSNESS: ICD-10-CM

## 2019-01-22 DIAGNOSIS — F11.20 UNCOMPLICATED OPIOID DEPENDENCE: ICD-10-CM

## 2019-01-22 DIAGNOSIS — E55.9 VITAMIN D DEFICIENCY: ICD-10-CM

## 2019-01-22 DIAGNOSIS — C20 RECTAL CANCER: ICD-10-CM

## 2019-01-22 DIAGNOSIS — G89.3 CHRONIC PAIN DUE TO NEOPLASM: ICD-10-CM

## 2019-01-22 DIAGNOSIS — I10 HYPERTENSION, UNSPECIFIED TYPE: ICD-10-CM

## 2019-01-22 DIAGNOSIS — Z91.89 DRIVING SAFETY ISSUE: ICD-10-CM

## 2019-01-22 PROCEDURE — 1101F PR PT FALLS ASSESS DOC 0-1 FALLS W/OUT INJ PAST YR: ICD-10-PCS | Mod: CPTII,HCNC,S$GLB, | Performed by: PSYCHIATRY & NEUROLOGY

## 2019-01-22 PROCEDURE — 3074F SYST BP LT 130 MM HG: CPT | Mod: CPTII,HCNC,S$GLB, | Performed by: PSYCHIATRY & NEUROLOGY

## 2019-01-22 PROCEDURE — 99999 PR PBB SHADOW E&M-EST. PATIENT-LVL III: ICD-10-PCS | Mod: PBBFAC,HCNC,, | Performed by: PSYCHIATRY & NEUROLOGY

## 2019-01-22 PROCEDURE — 3078F PR MOST RECENT DIASTOLIC BLOOD PRESSURE < 80 MM HG: ICD-10-PCS | Mod: CPTII,HCNC,S$GLB, | Performed by: PSYCHIATRY & NEUROLOGY

## 2019-01-22 PROCEDURE — 3044F HG A1C LEVEL LT 7.0%: CPT | Mod: CPTII,HCNC,S$GLB, | Performed by: PSYCHIATRY & NEUROLOGY

## 2019-01-22 PROCEDURE — 99999 PR PBB SHADOW E&M-EST. PATIENT-LVL III: CPT | Mod: PBBFAC,HCNC,, | Performed by: PSYCHIATRY & NEUROLOGY

## 2019-01-22 PROCEDURE — 99499 RISK ADDL DX/OHS AUDIT: ICD-10-PCS | Mod: S$GLB,,, | Performed by: PSYCHIATRY & NEUROLOGY

## 2019-01-22 PROCEDURE — 3078F DIAST BP <80 MM HG: CPT | Mod: CPTII,HCNC,S$GLB, | Performed by: PSYCHIATRY & NEUROLOGY

## 2019-01-22 PROCEDURE — 99499 UNLISTED E&M SERVICE: CPT | Mod: S$GLB,,, | Performed by: PSYCHIATRY & NEUROLOGY

## 2019-01-22 PROCEDURE — 3074F PR MOST RECENT SYSTOLIC BLOOD PRESSURE < 130 MM HG: ICD-10-PCS | Mod: CPTII,HCNC,S$GLB, | Performed by: PSYCHIATRY & NEUROLOGY

## 2019-01-22 PROCEDURE — 1101F PT FALLS ASSESS-DOCD LE1/YR: CPT | Mod: CPTII,HCNC,S$GLB, | Performed by: PSYCHIATRY & NEUROLOGY

## 2019-01-22 PROCEDURE — 99214 OFFICE O/P EST MOD 30 MIN: CPT | Mod: GW,HCNC,S$GLB, | Performed by: PSYCHIATRY & NEUROLOGY

## 2019-01-22 PROCEDURE — 99214 PR OFFICE/OUTPT VISIT, EST, LEVL IV, 30-39 MIN: ICD-10-PCS | Mod: GW,HCNC,S$GLB, | Performed by: PSYCHIATRY & NEUROLOGY

## 2019-01-22 PROCEDURE — 3044F PR MOST RECENT HEMOGLOBIN A1C LEVEL <7.0%: ICD-10-PCS | Mod: CPTII,HCNC,S$GLB, | Performed by: PSYCHIATRY & NEUROLOGY

## 2019-01-22 NOTE — PATIENT INSTRUCTIONS
What is Syncope?     The heart pumps blood nonstop to the brain and the rest of the body.      Syncope is an abrupt and temporary loss of consciousness associated with a loss of body muscle tone. It is often a result of a sudden decrease in blood flow to the brain or lack of oxygen delivery to the brain. Syncope is also known as fainting or a blackout. It is then followed by complete and usually rapid spontaneous recovery.  Understanding heart rate and blood pressure changes  Your brain and body need a steady supply of oxygen-rich blood. Your heart rate and blood pressure adjust to maintain this steady supply throughout all your activities.  · The heart creates electrical signals that travel through it on pathways. These signals set the heart rate, and tell the heart when to pump blood.  · In response to your bodys needs, your brain may also trigger changes in your heart rate and blood pressure. There are sensors in the body that detect the amount of blood flow going to the brain and other parts of the body. If these sensors detect low blood flow, they signal the body to increase the amount of fluid in the blood vessels and increase the heart rate to provide more circulation.  · The blood leaving the heart with each contraction supplies oxygen and nutrients as it circulates in your brain.  Warning signs  Syncope often happens suddenly. Some warning signs, including dimmed, blackened, or tunnel vision, lightheadedness, sleepiness, or a rapid heartbeat. Some people may feel nauseous or sweaty as well. However, you may have no warning signs at all. After syncope, you recover quickly, but you may feel tired. Do not drive if you are having warning signs that you may faint.  Is it serious?  Syncope is a common problem with many possible causes. Often these causes are not serious. For instance, syncope can be caused by standing for too long or sitting up too fast. In some cases, you may never faint again. However,  syncope in the setting of heart abnormalities can be a warning sign of more serious underlying problems. For this reason, your healthcare provider may order several tests to evaluate heart function and rhythm. If you have had syncope, call your healthcare provider to arrange an evaluation.  In older adults, syncope may be a sign that a heart attack has happened. Do not delay seeking treatment.  Even if the cause of syncope is not serious, there is a risk of injury with falling during loss of consciousness. When possible, finding a cause can improve your safety and reduce risk of injury.  Date Last Reviewed: 5/1/2016  © 4262-8431 [employer]. [address] [place] [address], [place] [address]. All rights reserved. This information is not intended as a substitute for professional medical care. Always follow your healthcare professional's instructions.

## 2019-01-22 NOTE — PROGRESS NOTES
Subjective:       Patient ID: Solo Cid is a 68 y.o. male.    Chief Complaint: Follow-up (results)    HPI       BACKGROUND HISTORY       The patient is accompanied by a friend who contributed to the history. In  he had an episode where he woke up finding himself on the floor. The last thing he recalled was being on the sofa.  He was alone with no witness. He was unclear about tongue biting but might have had urine incontinence. No know history of seizures or similar spells. He blacked out 6 months ago when he felt overheated. He continues to drive. On 08- Brain was obtained and was unremarkable.  I ordered EEG and seizure precautions and cardiac eval. 10- EEG is NL. No new spells. He is accompanied by his sister today. He will undergo cardiac monitoring.      INTERVAL HISTORY     A-EEG for 5 days is NL. No LOC episodes.         Review of Systems   Constitutional: Negative for appetite change and fatigue.   HENT: Negative for hearing loss and tinnitus.    Eyes: Negative for photophobia and visual disturbance.   Respiratory: Negative for apnea and shortness of breath.    Cardiovascular: Negative for chest pain and palpitations.   Gastrointestinal: Negative for nausea and vomiting.   Endocrine: Negative for cold intolerance and heat intolerance.   Genitourinary: Negative for difficulty urinating and urgency.   Musculoskeletal: Negative for arthralgias, back pain, gait problem, joint swelling, myalgias, neck pain and neck stiffness.   Skin: Negative for color change and rash.   Allergic/Immunologic: Negative for environmental allergies and immunocompromised state.   Neurological: Positive for syncope. Negative for dizziness, tremors, seizures, facial asymmetry, speech difficulty, weakness, light-headedness, numbness and headaches.   Hematological: Negative for adenopathy. Does not bruise/bleed easily.   Psychiatric/Behavioral: Negative for agitation, behavioral problems, confusion, decreased  concentration, dysphoric mood, hallucinations, self-injury, sleep disturbance and suicidal ideas. The patient is not nervous/anxious and is not hyperactive.          Current Outpatient Medications:     finasteride (PROSCAR) 5 mg tablet, TAKE 1 TABLET(5 MG) BY MOUTH EVERY DAY, Disp: 90 tablet, Rfl: 11    HYDROcodone-acetaminophen (NORCO)  mg per tablet, Take by mouth., Disp: , Rfl:     LONSURF 20-8.19 mg Tab, , Disp: , Rfl:     polyethylene glycol (GLYCOLAX) 17 gram/dose powder, Take 17 g by mouth once daily., Disp: 119 g, Rfl: 11    ranitidine (ZANTAC) 150 MG tablet, TAKE 1 TABLET(150 MG) BY MOUTH TWICE DAILY, Disp: 180 tablet, Rfl: 1    tamsulosin (FLOMAX) 0.4 mg Cp24, Take 1 capsule (0.4 mg total) by mouth once daily., Disp: 30 capsule, Rfl: 11    tiZANidine (ZANAFLEX) 4 MG tablet, TAKE 1 TABLET(4 MG) BY MOUTH EVERY 6 HOURS AS NEEDED, Disp: 385 tablet, Rfl: 1    ergocalciferol (ERGOCALCIFEROL) 50,000 unit Cap, TAKE 1 CAPSULE BY MOUTH EVERY 7 DAYS, Disp: 8 capsule, Rfl: 0  Past Medical History:   Diagnosis Date    Abnormal LFTs     Antineoplastic chemotherapy induced anemia 7/25/2017    Arthritis     Back pain     Chronic bronchitis     Convulsive syncope     Diabetes mellitus without complication     A1c 6.3% 2017 on metformin 500 mg QD only    Gastrostomy status     Hepatitis C antibody positive in blood     History of hypertension     Cured with weight loss    LOC (loss of consciousness) 09/25/2018    Unclear etiology per Dr. Cecily Grider in neurology clinic, who rec CV eval and continued LOC precautions    Neoplastic (malignant) related fatigue     Neuropathy of both feet 3/8/2017    Pancytopenia 12/12/2017    Present currently 2/2 chemo    Peripheral neuropathy 03/08/2017    2/2 chemo; Noted by NP during preventive visit; oncologist, Dr. Siddiqui, is aware    Polyneuropathy 9/25/2018    Rectal cancer metastasized to lung 04/30/2014    Stage IIIB, [id number]; s/p s;p Abdominoperineal  resec 5/29/15, path: 0.75 cm tumor, neg margins, grade 2, 0 of 14 LN pos, path T3N0. XRT/CTX (Oncologist: Dr. Roth Shows: [phone]/Abdominal Perineal Resec(Proctologist: Dr. Shivam Reynaga); s/p 5-FU, rad 02/19/15-3/30/15 Dr. Shrestha. FOLFOX 8/10/15-16; 18 CT Chest, abn/pel: mult pulm nodules SUSAN needle bx +met adenocarcinoma    Visual disturbance 2017    bilateral blurry vision reported to NP on prevention visit    Weight loss, non-intentional     Appetite decreased but now improving     Past Surgical History:   Procedure Laterality Date    CERVICAL FUSION      COLOSTOMY      MEDIPORT INSERTION, SINGLE       Social History     Socioeconomic History    Marital status: Single     Spouse name: Not on file    Number of children: Not on file    Years of education: Not on file    Highest education level: Not on file   Social Needs    Financial resource strain: Not on file    Food insecurity - worry: Not on file    Food insecurity - inability: Not on file    Transportation needs - medical: Not on file    Transportation needs - non-medical: Not on file   Occupational History    Not on file   Tobacco Use    Smoking status: Former Smoker     Last attempt to quit: 2015     Years since quittin.0    Smokeless tobacco: Never Used   Substance and Sexual Activity    Alcohol use: No    Drug use: No    Sexual activity: Not Currently   Other Topics Concern    Not on file   Social History Narrative    Not on file       Objective:     GENERAL APPEARANCE:     The patient looks comfortable.    No signs of medical or psychiatric distress.    Normal breathing pattern.    No dysmorphic features    Normal eye contact.     GENERAL MEDICAL EXAM:    HEENT:  Head is atraumatic normocephalic.     Neck and Axillae: No JVD.     Cardiopulmonary: No cyanosis. No tachypnea. Normal respiratory effort.    Gastrointestinal:  LLQ Colostomy. No hernias.    Skin, Hair and Nails: No  pathognonomic skin rash. No neurofibromatosis. No stigmata of autoimmune disease.     Limbs: No varicose veins. No edema. Symmetric pulses.     Muskoskeletal: No deformities.No signs of longstanding neuropathy. No dislocations or fractures.      Neurologic Exam     Mental Status   Oriented to person, place, and time.   Registration: recalls 3 of 3 objects. Recall at 5 minutes: recalls 3 of 3 objects. Follows 3 step commands.   Attention: normal. Concentration: normal.   Speech: speech is normal   Level of consciousness: alert  Knowledge: good and consistent with education. Able to perform simple calculations.   Able to name object. Able to read. Able to repeat. Able to write. Normal comprehension.     Cranial Nerves     CN II   Visual fields full to confrontation.   Visual acuity: normal  Right visual field deficit: none  Left visual field deficit: none     CN III, IV, VI   Pupils are equal, round, and reactive to light.  Extraocular motions are normal.   Right pupil: Size: 2 mm. Shape: regular. Reactivity: brisk. Consensual response: intact. Accommodation: intact.   Left pupil: Size: 2 mm. Shape: regular. Reactivity: brisk. Consensual response: intact. Accommodation: intact.   CN III: no CN III palsy  CN VI: no CN VI palsy  Nystagmus: none   Diplopia: none  Ophthalmoparesis: none  Upgaze: normal  Downgaze: normal  Conjugate gaze: present  Vestibulo-ocular reflex: present    CN V   Facial sensation intact.   Right facial sensation deficit: none  Left facial sensation deficit: none  Right corneal reflex: normal  Left corneal reflex: normal    CN VII   Right facial weakness: none  Left facial weakness: none  Right taste: normal  Left taste: normal    CN VIII   CN VIII normal.   Hearing: intact  Right Rinne: AC > BC  Left Rinne: AC > BC  Fowler: does not lateralize     CN IX, X   CN IX normal.   CN X normal.   Palate: symmetric  Right gag reflex: normal  Left gag reflex: normal    CN XI   CN XI normal.   Right  sternocleidomastoid strength: normal  Left sternocleidomastoid strength: normal  Right trapezius strength: normal  Left trapezius strength: normal    CN XII   CN XII normal.   Tongue: not atrophic  Fasciculations: absent  Tongue deviation: none    Motor Exam   Muscle bulk: normal  Overall muscle tone: normal  Right arm tone: normal  Left arm tone: normal  Right arm pronator drift: absent  Left arm pronator drift: absent  Right leg tone: normal  Left leg tone: normal    Strength   Strength 5/5 throughout.   Right neck flexion: 5/5  Left neck flexion: 5/5  Right neck extension: 5/5  Left neck extension: 5/5  Right deltoid: 5/5  Left deltoid: 5/5  Right biceps: 5/5  Left biceps: 5/5  Right triceps: 5/5  Left triceps: 5/5  Right wrist flexion: 5/5  Left wrist flexion: 5/5  Right wrist extension: 5/5  Left wrist extension: 5/5  Right interossei: 5/5  Left interossei: 5/5  Right abdominals: 5/5  Left abdominals: 5/5  Right iliopsoas: 5/5  Left iliopsoas: 5/5  Right quadriceps: 5/5  Left quadriceps: 5/5  Right hamstrin/5  Left hamstrin/5  Right glutei: 5/5  Left glutei: 5/5  Right anterior tibial: 5/5  Left anterior tibial: 5/5  Right posterior tibial: 5/5  Left posterior tibial: 5/5  Right peroneal: 5/5  Left peroneal: 5/5  Right gastroc: 5/5  Left gastroc: 5/5    Sensory Exam   Right arm light touch: normal  Left arm light touch: normal  Right leg light touch: decreased from toes  Left leg light touch: decreased from toes  Vibration normal.   Right arm vibration: normal  Left arm vibration: normal  Right leg vibration: normal  Left leg vibration: normal  Proprioception normal.   Right arm proprioception: normal  Left arm proprioception: normal  Right leg proprioception: normal  Left leg proprioception: normal  Right arm pinprick: normal  Left arm pinprick: normal  Right leg pinprick: decreased from toes  Left leg pinprick: decreased from toes  Graphesthesia: normal  Stereognosis: normal    Gait, Coordination, and  Reflexes     Gait  Gait: normal    Coordination   Romberg: negative  Finger to nose coordination: normal  Heel to shin coordination: normal  Tandem walking coordination: normal    Tremor   Resting tremor: absent  Intention tremor: absent  Action tremor: absent    Reflexes   Right brachioradialis: 2+  Left brachioradialis: 2+  Right biceps: 2+  Left biceps: 2+  Right triceps: 2+  Left triceps: 2+  Right patellar: 2+  Left patellar: 2+  Right achilles: 1+  Left achilles: 1+  Right : 2+  Left : 2+  Right plantar: normal  Left plantar: normal  Right Wagoner: absent  Left Wagoner: absent  Right ankle clonus: absent  Left ankle clonus: absent  Right pendular knee jerk: absent  Left pendular knee jerk: absent      Lab Results   Component Value Date    WBC 4.26 07/23/2018    HGB 11.2 (L) 07/23/2018    HCT 34.7 (L) 07/23/2018    MCV 99 (H) 07/23/2018     07/23/2018     Sodium   Date Value Ref Range Status   07/23/2018 135 (L) 136 - 145 mmol/L Final     Potassium   Date Value Ref Range Status   07/23/2018 3.9 3.5 - 5.1 mmol/L Final     Chloride   Date Value Ref Range Status   07/23/2018 100 95 - 110 mmol/L Final     CO2   Date Value Ref Range Status   07/23/2018 27 23 - 29 mmol/L Final     Glucose   Date Value Ref Range Status   07/23/2018 91 70 - 110 mg/dL Final     BUN, Bld   Date Value Ref Range Status   08/30/2018 14 8 - 23 mg/dL Final     Creatinine   Date Value Ref Range Status   08/30/2018 0.8 0.5 - 1.4 mg/dL Final     Calcium   Date Value Ref Range Status   07/23/2018 9.6 8.7 - 10.5 mg/dL Final     Total Protein   Date Value Ref Range Status   07/23/2018 8.0 6.0 - 8.4 g/dL Final     Albumin   Date Value Ref Range Status   07/23/2018 3.4 (L) 3.5 - 5.2 g/dL Final     Total Bilirubin   Date Value Ref Range Status   07/23/2018 0.9 0.1 - 1.0 mg/dL Final     Comment:     For infants and newborns, interpretation of results should be based  on gestational age, weight and in agreement with  clinical  observations.  Premature Infant recommended reference ranges:  Up to 24 hours.............<8.0 mg/dL  Up to 48 hours............<12.0 mg/dL  3-5 days..................<15.0 mg/dL  6-29 days.................<15.0 mg/dL       Alkaline Phosphatase   Date Value Ref Range Status   07/23/2018 96 55 - 135 U/L Final     AST   Date Value Ref Range Status   07/23/2018 111 (H) 10 - 40 U/L Final     ALT   Date Value Ref Range Status   07/23/2018 81 (H) 10 - 44 U/L Final     Anion Gap   Date Value Ref Range Status   07/23/2018 8 8 - 16 mmol/L Final     eGFR if    Date Value Ref Range Status   08/30/2018 >60 >60 mL/min/1.73 m^2 Final     eGFR if non    Date Value Ref Range Status   08/30/2018 >60 >60 mL/min/1.73 m^2 Final     Comment:     Calculation used to obtain the estimated glomerular filtration  rate (eGFR) is the CKD-EPI equation.        No results found for: DEHKRPVK20  Lab Results   Component Value Date    TSH 1.297 07/23/2018 08-    Brain MRI.I reviewed it personally and is unremarkable    10-     EEG NL    12-    A-EEG 5 days NL           Assessment:       1. Convulsive syncope    2. Polyneuropathy    3. LOC (loss of consciousness)    4. Uncomplicated opioid dependence    5. Essential hypertension    6. Hypertension, unspecified type    7. Amyloidosis, unspecified type    8. Rectal cancer S/P colostomy    9. Chronic pain due to neoplasm    10. History of rectal cancer    11. Vitamin D deficiency    12. Abnormal LFTs    13. Gastroesophageal reflux disease without esophagitis    14. Gastrostomy status    15. Episode of loss of consciousness    16. Driving safety issue    17. Diabetes mellitus without complication        Plan:         PROBABLE SYNCOPE      The circumstances surrounding the LOC spell remain unclear with extensive EEG that is NL.      I instructed him to maintain LOC precautions which include but not limited to avoid driving, avoid high  altitudes, avoid being close to fire or fire source, avoid being close to a body of water or swimming alone, avoid operating heavy machinery and avoid using sharp objects if possible. The patient was encouraged to shower (without accumulation of water) instead of taking a bath if unsupervised.The patient was also advised not to care for children without company. The patient was advised to pad the side rails with pillows and blankets if applicable and sleep as close to the floor as possible. I strongly recommended lowering the bed to the floor level to decrease the risk of falls. I also instructed the patient to avoid safety sensitive duties. In general, any activity that requires full awareness and would result in serious injury to self and others if LOC occurs should be avoided. The patient verbalized full understanding.          RTC PRN

## 2019-01-29 ENCOUNTER — PATIENT OUTREACH (OUTPATIENT)
Dept: ADMINISTRATIVE | Facility: HOSPITAL | Age: 69
End: 2019-01-29

## 2019-02-07 ENCOUNTER — CLINICAL SUPPORT (OUTPATIENT)
Dept: CARDIOLOGY | Facility: CLINIC | Age: 69
End: 2019-02-07
Attending: INTERNAL MEDICINE
Payer: MEDICARE

## 2019-02-07 ENCOUNTER — TELEPHONE (OUTPATIENT)
Dept: CARDIOLOGY | Facility: CLINIC | Age: 69
End: 2019-02-07

## 2019-02-07 DIAGNOSIS — R55 SYNCOPE AND COLLAPSE: ICD-10-CM

## 2019-02-07 LAB
DIASTOLIC DYSFUNCTION: NO
ESTIMATED PA SYSTOLIC PRESSURE: 27.4
INTERNAL CAROTID STENOSIS: NORMAL
RETIRED EF AND QEF - SEE NOTES: 60 (ref 55–65)

## 2019-02-07 PROCEDURE — 93306 TTE W/DOPPLER COMPLETE: CPT | Mod: HCNC,GW,S$GLB, | Performed by: INTERNAL MEDICINE

## 2019-02-07 PROCEDURE — 93880 EXTRACRANIAL BILAT STUDY: CPT | Mod: HCNC,GW,S$GLB, | Performed by: INTERNAL MEDICINE

## 2019-02-07 PROCEDURE — 93224 XTRNL ECG REC UP TO 48 HRS: CPT | Mod: HCNC,GW,S$GLB, | Performed by: NUCLEAR MEDICINE

## 2019-02-07 PROCEDURE — 93306 2D ECHO WITH COLOR FLOW DOPPLER: ICD-10-PCS | Mod: HCNC,GW,S$GLB, | Performed by: INTERNAL MEDICINE

## 2019-02-07 PROCEDURE — 93224 HOLTER MONITOR - 48 HOUR: ICD-10-PCS | Mod: HCNC,GW,S$GLB, | Performed by: NUCLEAR MEDICINE

## 2019-02-07 PROCEDURE — 93880 CAR US DOPPLER CAROTID BILATERAL: ICD-10-PCS | Mod: HCNC,GW,S$GLB, | Performed by: INTERNAL MEDICINE

## 2019-02-07 NOTE — TELEPHONE ENCOUNTER
Called to patient and gave results of carotid ultrasound and medication order to take aspirin 81 mg tablets one tablet daily--patient was given results of echo--all questions answered-patient verbalizes understanding

## 2019-02-07 NOTE — TELEPHONE ENCOUNTER
----- Message from Ronen Moses MD sent at 2/7/2019  1:01 PM CST -----  Please call patient regarding normal result of mild plaque in neck arteries, take a daily aspirin 81mg and will follow up with further ultrasounds. If he/she has any questions please let me know or have him/her schedule an appt to see me soon to discuss. Thank you

## 2019-02-12 ENCOUNTER — OFFICE VISIT (OUTPATIENT)
Dept: INTERNAL MEDICINE | Facility: CLINIC | Age: 69
End: 2019-02-12
Payer: MEDICARE

## 2019-02-12 ENCOUNTER — LAB VISIT (OUTPATIENT)
Dept: LAB | Facility: HOSPITAL | Age: 69
End: 2019-02-12
Attending: INTERNAL MEDICINE
Payer: MEDICARE

## 2019-02-12 VITALS
DIASTOLIC BLOOD PRESSURE: 62 MMHG | SYSTOLIC BLOOD PRESSURE: 116 MMHG | OXYGEN SATURATION: 96 % | RESPIRATION RATE: 16 BRPM | TEMPERATURE: 96 F | BODY MASS INDEX: 24.8 KG/M2 | HEART RATE: 99 BPM | HEIGHT: 71 IN | WEIGHT: 177.13 LBS

## 2019-02-12 DIAGNOSIS — Z71.89 COUNSELING ON HEALTH PROMOTION AND DISEASE PREVENTION: Primary | ICD-10-CM

## 2019-02-12 DIAGNOSIS — D12.6 HIGH GRADE DYSPLASIA IN COLONIC ADENOMA: ICD-10-CM

## 2019-02-12 DIAGNOSIS — R74.01 TRANSAMINITIS: ICD-10-CM

## 2019-02-12 DIAGNOSIS — Z86.39 HISTORY OF TYPE 2 DIABETES MELLITUS: ICD-10-CM

## 2019-02-12 DIAGNOSIS — Z85.048 HISTORY OF RECTAL CANCER: ICD-10-CM

## 2019-02-12 PROBLEM — K21.9 GASTROESOPHAGEAL REFLUX DISEASE WITHOUT ESOPHAGITIS: Status: RESOLVED | Noted: 2017-03-08 | Resolved: 2019-02-12

## 2019-02-12 LAB
ALBUMIN SERPL BCP-MCNC: 3 G/DL
ALP SERPL-CCNC: 95 U/L
ALT SERPL W/O P-5'-P-CCNC: 25 U/L
ANION GAP SERPL CALC-SCNC: 8 MMOL/L
AST SERPL-CCNC: 40 U/L
BILIRUB SERPL-MCNC: 0.6 MG/DL
BUN SERPL-MCNC: 12 MG/DL
CALCIUM SERPL-MCNC: 9.8 MG/DL
CHLORIDE SERPL-SCNC: 101 MMOL/L
CO2 SERPL-SCNC: 29 MMOL/L
CREAT SERPL-MCNC: 0.8 MG/DL
EST. GFR  (AFRICAN AMERICAN): >60 ML/MIN/1.73 M^2
EST. GFR  (NON AFRICAN AMERICAN): >60 ML/MIN/1.73 M^2
ESTIMATED AVG GLUCOSE: 80 MG/DL
GLUCOSE SERPL-MCNC: 87 MG/DL
HBA1C MFR BLD HPLC: 4.4 %
POTASSIUM SERPL-SCNC: 3.9 MMOL/L
PROT SERPL-MCNC: 8.7 G/DL
SODIUM SERPL-SCNC: 138 MMOL/L

## 2019-02-12 PROCEDURE — 90662 IIV NO PRSV INCREASED AG IM: CPT | Mod: HCNC,S$GLB,, | Performed by: INTERNAL MEDICINE

## 2019-02-12 PROCEDURE — G0009 PNEUMOCOCCAL POLYSACCHARIDE VACCINE 23-VALENT =>2YO SQ IM: ICD-10-PCS | Mod: HCNC,GW,S$GLB, | Performed by: INTERNAL MEDICINE

## 2019-02-12 PROCEDURE — 36415 COLL VENOUS BLD VENIPUNCTURE: CPT | Mod: HCNC,PO

## 2019-02-12 PROCEDURE — 99999 PR PBB SHADOW E&M-EST. PATIENT-LVL IV: CPT | Mod: PBBFAC,HCNC,, | Performed by: INTERNAL MEDICINE

## 2019-02-12 PROCEDURE — 90662 FLU VACCINE - HIGH DOSE (65+) PRESERVATIVE FREE IM: ICD-10-PCS | Mod: HCNC,S$GLB,, | Performed by: INTERNAL MEDICINE

## 2019-02-12 PROCEDURE — 1101F PT FALLS ASSESS-DOCD LE1/YR: CPT | Mod: HCNC,CPTII,S$GLB, | Performed by: INTERNAL MEDICINE

## 2019-02-12 PROCEDURE — G0009 ADMIN PNEUMOCOCCAL VACCINE: HCPCS | Mod: HCNC,GW,S$GLB, | Performed by: INTERNAL MEDICINE

## 2019-02-12 PROCEDURE — 3078F PR MOST RECENT DIASTOLIC BLOOD PRESSURE < 80 MM HG: ICD-10-PCS | Mod: HCNC,CPTII,S$GLB, | Performed by: INTERNAL MEDICINE

## 2019-02-12 PROCEDURE — 1101F PR PT FALLS ASSESS DOC 0-1 FALLS W/OUT INJ PAST YR: ICD-10-PCS | Mod: HCNC,CPTII,S$GLB, | Performed by: INTERNAL MEDICINE

## 2019-02-12 PROCEDURE — G0008 ADMIN INFLUENZA VIRUS VAC: HCPCS | Mod: HCNC,GW,S$GLB, | Performed by: INTERNAL MEDICINE

## 2019-02-12 PROCEDURE — 3078F DIAST BP <80 MM HG: CPT | Mod: HCNC,CPTII,S$GLB, | Performed by: INTERNAL MEDICINE

## 2019-02-12 PROCEDURE — G0008 FLU VACCINE - HIGH DOSE (65+) PRESERVATIVE FREE IM: ICD-10-PCS | Mod: HCNC,GW,S$GLB, | Performed by: INTERNAL MEDICINE

## 2019-02-12 PROCEDURE — 3074F SYST BP LT 130 MM HG: CPT | Mod: HCNC,CPTII,S$GLB, | Performed by: INTERNAL MEDICINE

## 2019-02-12 PROCEDURE — 99214 PR OFFICE/OUTPT VISIT, EST, LEVL IV, 30-39 MIN: ICD-10-PCS | Mod: 25,HCNC,GW,S$GLB | Performed by: INTERNAL MEDICINE

## 2019-02-12 PROCEDURE — 3074F PR MOST RECENT SYSTOLIC BLOOD PRESSURE < 130 MM HG: ICD-10-PCS | Mod: HCNC,CPTII,S$GLB, | Performed by: INTERNAL MEDICINE

## 2019-02-12 PROCEDURE — 90732 PPSV23 VACC 2 YRS+ SUBQ/IM: CPT | Mod: HCNC,GW,S$GLB, | Performed by: INTERNAL MEDICINE

## 2019-02-12 PROCEDURE — 83036 HEMOGLOBIN GLYCOSYLATED A1C: CPT | Mod: HCNC

## 2019-02-12 PROCEDURE — 90732 PNEUMOCOCCAL POLYSACCHARIDE VACCINE 23-VALENT =>2YO SQ IM: ICD-10-PCS | Mod: HCNC,GW,S$GLB, | Performed by: INTERNAL MEDICINE

## 2019-02-12 PROCEDURE — 99214 OFFICE O/P EST MOD 30 MIN: CPT | Mod: 25,HCNC,GW,S$GLB | Performed by: INTERNAL MEDICINE

## 2019-02-12 PROCEDURE — 99999 PR PBB SHADOW E&M-EST. PATIENT-LVL IV: ICD-10-PCS | Mod: PBBFAC,HCNC,, | Performed by: INTERNAL MEDICINE

## 2019-02-12 PROCEDURE — 80053 COMPREHEN METABOLIC PANEL: CPT | Mod: HCNC

## 2019-02-12 NOTE — PROGRESS NOTES
Subjective:      Patient ID: Solo Cid is a 68 y.o. male.    Chief Complaint: Follow-up      HPI     Mr. Solo Cid is a patient of Jude Malagon MD, who presents for Follow-up  on transaminitis.      He denies alcohol. He denies taking NSAIDS. He takes Narco for neck/back pain.    Of note he was restarted on ASA81. He denies bruising.     VS and labs reviewed and discussed with patient, +3 lb wt gain since last visit, vitamin D 35 ng/mL 6 mo ago.      Past Medical History:   Diagnosis Date    Abnormal LFTs     Antineoplastic chemotherapy induced anemia 7/25/2017    Arthritis     Back pain     Chronic bronchitis     Convulsive syncope     Gastrostomy status     Hepatitis C antibody positive in blood     History of diabetes mellitus, type II     A1c 6.3% 2017 on metformin 500 mg QD only    History of hypertension     Cured with weight loss    LOC (loss of consciousness) 09/25/2018    Unclear etiology per Dr. Cecily Grider in neurology clinic, who rec CV eval and continued LOC precautions    Neoplastic (malignant) related fatigue     Neuropathy of both feet 3/8/2017    Pancytopenia 12/12/2017    Present currently 2/2 chemo    Peripheral neuropathy 03/08/2017    2/2 chemo; Noted by NP during preventive visit; oncologist, Dr. Siddiqui, is aware    Polyneuropathy 9/25/2018    Rectal cancer metastasized to lung 04/30/2014    Stage IIIB, [id number]; s/p s;p Abdominoperineal resec 5/29/15, path: 0.75 cm tumor, neg margins, grade 2, 0 of 14 LN pos, path T3N0. XRT/CTX (Oncologist: Dr. Gonzalez Siddiqui: [phone]/Abdominal Perineal Resec(Proctologist: Dr. Shivam Reynaga); s/p 5-FU, rad 02/19/15-3/30/15 Dr. Shrestha. FOLFOX 8/10/15-1/25/16; 5/25/18 CT Chest, abn/pel: mult pulm nodules SUSAN needle bx +met adenocarcinoma    Visual disturbance 03/08/2017    bilateral blurry vision reported to NP on prevention visit    Weight loss, non-intentional     Appetite decreased but now improving     Past  Surgical History:   Procedure Laterality Date    CERVICAL FUSION      COLOSTOMY  2015    MEDIPORT INSERTION, SINGLE       Social History     Socioeconomic History    Marital status: Single     Spouse name: Not on file    Number of children: Not on file    Years of education: Not on file    Highest education level: Not on file   Social Needs    Financial resource strain: Not on file    Food insecurity - worry: Not on file    Food insecurity - inability: Not on file    Transportation needs - medical: Not on file    Transportation needs - non-medical: Not on file   Occupational History    Not on file   Tobacco Use    Smoking status: Former Smoker     Last attempt to quit: 2015     Years since quittin.0    Smokeless tobacco: Never Used   Substance and Sexual Activity    Alcohol use: No    Drug use: No    Sexual activity: Not Currently   Other Topics Concern    Not on file   Social History Narrative    Not on file     Family History   Problem Relation Age of Onset    Hypertension Mother     Diabetes Mother     Kidney disease Father         Dialysis    Hypertension Father     Stroke Father     Hypertension Sister     Heart attack Brother     Heart attack Brother     Cancer Neg Hx        Current Outpatient Medications:     finasteride (PROSCAR) 5 mg tablet, TAKE 1 TABLET(5 MG) BY MOUTH EVERY DAY, Disp: 90 tablet, Rfl: 11    HYDROcodone-acetaminophen (NORCO)  mg per tablet, Take by mouth., Disp: , Rfl:     LONSURF 20-8.19 mg Tab, , Disp: , Rfl:     ranitidine (ZANTAC) 150 MG tablet, TAKE 1 TABLET(150 MG) BY MOUTH TWICE DAILY, Disp: 180 tablet, Rfl: 1    tamsulosin (FLOMAX) 0.4 mg Cp24, Take 1 capsule (0.4 mg total) by mouth once daily., Disp: 30 capsule, Rfl: 11    tiZANidine (ZANAFLEX) 4 MG tablet, TAKE 1 TABLET(4 MG) BY MOUTH EVERY 6 HOURS AS NEEDED, Disp: 385 tablet, Rfl: 1    ergocalciferol (ERGOCALCIFEROL) 50,000 unit Cap, TAKE 1 CAPSULE BY MOUTH EVERY 7 DAYS,  "Disp: 8 capsule, Rfl: 0    polyethylene glycol (GLYCOLAX) 17 gram/dose powder, Take 17 g by mouth once daily., Disp: 119 g, Rfl: 11    Review of patient's allergies indicates:   Allergen Reactions    Pork/porcine containing products         Review of Systems   All remaining systems negative    Objective:     /62 (BP Location: Left arm, Patient Position: Sitting, BP Method: Medium (Manual))   Pulse 99   Temp 96.3 °F (35.7 °C) (Tympanic)   Resp 16   Ht 5' 11" (1.803 m)   Wt 80.3 kg (177 lb 2.2 oz)   SpO2 96%   BMI 24.71 kg/m²     Physical Exam  GEN: A&O fully, NAD  PSYC: Normal affect      Lab Results   Component Value Date    WBC 4.26 07/23/2018    HGB 11.2 (L) 07/23/2018    HCT 34.7 (L) 07/23/2018     07/23/2018    CHOL 137 07/23/2018    TRIG 81 07/23/2018    HDL 46 07/23/2018    LDLCALC 74.8 07/23/2018    ALT 81 (H) 07/23/2018     (H) 07/23/2018     (L) 07/23/2018    K 3.9 07/23/2018     07/23/2018    CREATININE 0.8 08/30/2018    BUN 14 08/30/2018    CO2 27 07/23/2018    CALCIUM 9.6 07/23/2018    PSA 0.67 03/17/2017    HGBA1C 4.8 07/23/2018       Assessment:      1. Counseling on health promotion and disease prevention    2. Transaminitis: Will f/u.    3. History of type 2 diabetes mellitus: Will check A1c one last time.    4. History of rectal cancer: s/p colectomy with mets to lung s/p chemo. Continue f/u w/ oncology prn.   5. HM: PPSV23 & high dose FLU in each arm today.        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    I spent 25 minutes of time with patient 50% or more of which was discussing labs and plans of care.

## 2019-02-20 ENCOUNTER — TELEPHONE (OUTPATIENT)
Dept: CARDIOLOGY | Facility: CLINIC | Age: 69
End: 2019-02-20

## 2019-02-20 NOTE — TELEPHONE ENCOUNTER
----- Message from Ronen Moses MD sent at 2/11/2019  1:28 PM CST -----  Please call patient regarding normal result of overall monitor with occasional extra beats from top and bottom chambers of heart. If he/she has any questions please let me know or have him/her schedule an appt to see me soon to discuss. Thank you

## 2019-03-15 ENCOUNTER — PES CALL (OUTPATIENT)
Dept: ADMINISTRATIVE | Facility: CLINIC | Age: 69
End: 2019-03-15

## 2019-05-29 ENCOUNTER — PATIENT OUTREACH (OUTPATIENT)
Dept: ADMINISTRATIVE | Facility: HOSPITAL | Age: 69
End: 2019-05-29

## 2019-06-05 ENCOUNTER — TELEPHONE (OUTPATIENT)
Dept: RADIOLOGY | Facility: HOSPITAL | Age: 69
End: 2019-06-05

## 2019-07-18 ENCOUNTER — PATIENT OUTREACH (OUTPATIENT)
Dept: ADMINISTRATIVE | Facility: HOSPITAL | Age: 69
End: 2019-07-18

## 2019-07-18 NOTE — PROGRESS NOTES
Spoke with CgSisi, who stated pt is now a permanent resident at [facility] in [place], [place]. And pt has PCP through Nursing Home. Care teams updated.